# Patient Record
Sex: FEMALE | Race: WHITE | NOT HISPANIC OR LATINO
[De-identification: names, ages, dates, MRNs, and addresses within clinical notes are randomized per-mention and may not be internally consistent; named-entity substitution may affect disease eponyms.]

---

## 2017-05-08 ENCOUNTER — MEDICATION RENEWAL (OUTPATIENT)
Age: 47
End: 2017-05-08

## 2017-07-28 ENCOUNTER — APPOINTMENT (OUTPATIENT)
Dept: PAIN MANAGEMENT | Facility: CLINIC | Age: 47
End: 2017-07-28
Payer: COMMERCIAL

## 2017-07-28 VITALS
WEIGHT: 154 LBS | SYSTOLIC BLOOD PRESSURE: 108 MMHG | BODY MASS INDEX: 22.81 KG/M2 | HEART RATE: 66 BPM | DIASTOLIC BLOOD PRESSURE: 75 MMHG | HEIGHT: 69 IN

## 2017-07-28 PROCEDURE — 99213 OFFICE O/P EST LOW 20 MIN: CPT

## 2017-11-19 ENCOUNTER — TRANSCRIPTION ENCOUNTER (OUTPATIENT)
Age: 47
End: 2017-11-19

## 2018-01-01 ENCOUNTER — TRANSCRIPTION ENCOUNTER (OUTPATIENT)
Age: 48
End: 2018-01-01

## 2018-09-02 ENCOUNTER — TRANSCRIPTION ENCOUNTER (OUTPATIENT)
Age: 48
End: 2018-09-02

## 2018-09-04 ENCOUNTER — MEDICATION RENEWAL (OUTPATIENT)
Age: 48
End: 2018-09-04

## 2018-12-03 ENCOUNTER — MEDICATION RENEWAL (OUTPATIENT)
Age: 48
End: 2018-12-03

## 2019-03-14 ENCOUNTER — MEDICATION RENEWAL (OUTPATIENT)
Age: 49
End: 2019-03-14

## 2019-04-29 ENCOUNTER — TRANSCRIPTION ENCOUNTER (OUTPATIENT)
Age: 49
End: 2019-04-29

## 2019-05-21 ENCOUNTER — APPOINTMENT (OUTPATIENT)
Dept: INTERNAL MEDICINE | Facility: CLINIC | Age: 49
End: 2019-05-21
Payer: COMMERCIAL

## 2019-05-21 VITALS
DIASTOLIC BLOOD PRESSURE: 82 MMHG | RESPIRATION RATE: 18 BRPM | WEIGHT: 165 LBS | SYSTOLIC BLOOD PRESSURE: 118 MMHG | HEART RATE: 69 BPM | TEMPERATURE: 98.4 F | OXYGEN SATURATION: 98 % | BODY MASS INDEX: 24.44 KG/M2 | HEIGHT: 69 IN

## 2019-05-21 DIAGNOSIS — R55 SYNCOPE AND COLLAPSE: ICD-10-CM

## 2019-05-21 DIAGNOSIS — R76.0 RAISED ANTIBODY TITER: ICD-10-CM

## 2019-05-21 DIAGNOSIS — Z82.3 FAMILY HISTORY OF STROKE: ICD-10-CM

## 2019-05-21 DIAGNOSIS — F32.81 PREMENSTRUAL DYSPHORIC DISORDER: ICD-10-CM

## 2019-05-21 DIAGNOSIS — Z83.3 FAMILY HISTORY OF DIABETES MELLITUS: ICD-10-CM

## 2019-05-21 DIAGNOSIS — Z82.5 FAMILY HISTORY OF ASTHMA AND OTHER CHRONIC LOWER RESPIRATORY DISEASES: ICD-10-CM

## 2019-05-21 DIAGNOSIS — Z80.1 FAMILY HISTORY OF MALIGNANT NEOPLASM OF TRACHEA, BRONCHUS AND LUNG: ICD-10-CM

## 2019-05-21 DIAGNOSIS — R20.2 PARESTHESIA OF SKIN: ICD-10-CM

## 2019-05-21 DIAGNOSIS — Z87.19 PERSONAL HISTORY OF OTHER DISEASES OF THE DIGESTIVE SYSTEM: ICD-10-CM

## 2019-05-21 DIAGNOSIS — Z87.898 PERSONAL HISTORY OF OTHER SPECIFIED CONDITIONS: ICD-10-CM

## 2019-05-21 PROCEDURE — 90471 IMMUNIZATION ADMIN: CPT

## 2019-05-21 PROCEDURE — 81003 URINALYSIS AUTO W/O SCOPE: CPT | Mod: QW

## 2019-05-21 PROCEDURE — 99386 PREV VISIT NEW AGE 40-64: CPT | Mod: 25

## 2019-05-21 PROCEDURE — 90715 TDAP VACCINE 7 YRS/> IM: CPT

## 2019-05-21 NOTE — PHYSICAL EXAM

## 2019-05-21 NOTE — ASSESSMENT
[FreeTextEntry1] : New patient is a 48 year old female with a past medical history as above who presents for annual physical exam.\par

## 2019-05-21 NOTE — REVIEW OF SYSTEMS
[Recent Change In Weight] : ~T recent weight change [Lower Ext Edema] : lower extremity edema [Constipation] : constipation [Headache] : headache [Negative] : Respiratory [FreeTextEntry2] : gained 10 lbs in the past year  [FreeTextEntry5] : bilateral lower extremity edema  [de-identified] : migraine; intermittent tingling in fingers and toes bilaterally

## 2019-05-21 NOTE — PLAN
[FreeTextEntry1] : Cardiology\par start Ecotrin 81mg p.o.q.d. given family history of cardiolipin antibodies with family history of vascular edisease and clots\par follow up with cardiologist for baseline EKG \par Endocrinology\par hypothyroidism-continue Levothyroxine Sodium 112mcg p.o.q.d. - thyroid panel to be checked\par Vascular\par bilateral lower extremity edema - likely secondary to recent weight gain - recommended low sodium diet and increased CV exercise to promote weight loss \par Neurology\par migraine-continue Nortriptyline HCl 25mg p.o.q.h.s. - follow up with neurologist, Dr. Pavan Cosme \par tingling in upper and lower extremities bilaterally (fingers/toes) - possible secondary to being on Bactrim for recent UTI \par Infectious Disease\par Adacel vaccine administered \par \par Rx given for fasting blood work. \par check female panel, UA

## 2019-05-21 NOTE — ADDENDUM
[FreeTextEntry1] : I, Mahin Pemberton, acted solely as scribe for Dr. Tay Vásquez DO on this date 05/21/2019  3:30PM .\par \par All medical record entries made by the Scribe were at my, Dr. Tay Vásquez DO direction and personally dictated by me on 05/21/2019  3:30PM. I have reviewed the chart and agree that the record accurately reflects my personal performance of the history, physical exam, assessment and plan. I have also personally directed, reviewed and agreed with the chart.\par

## 2019-05-21 NOTE — HISTORY OF PRESENT ILLNESS
[FreeTextEntry1] : new patient annual physical exam  [de-identified] : New patient is a 48 year old female with a past medical history as below who presents for annual physical exam. Patient states she is taking all medications as prescribed and denies any adverse reactions or side effects. She denies heat/cold intolerance on Levothyroxine. She notes being started on Nortriptyline by her neurologist, Dr. Pavan Cosme for migraines. Patient notes bilateral lower extremity edema, likely secondary to recent weight gain of 10 pounds in the past year.  She notes recently visiting an urgent care facility for a UTI and being started on Bactrim. Symptoms of her UTI improved, but she began to notice tingling in her fingers and toes bilaterally 3 days into starting Bactrim. She states she returned to the urgent care facility and subsequent EKG was normal. Patient notes having a colonoscopy recently given history of GI issues and being diagnosed with IBS. Patient states she sees her gynecologist, Dr. Nilay Fisher annually for mammogram/breast sonogram. Last mammogram was 1 year ago. 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 She notes last febrile seizure was in 1999. She notes being hospitalized for her last episode of vasovagal syncope was 7-8 years ago. Testing revealed bradycardia and hypotension. Patient notes recent weight gain likely secondary to a poor diet/lack of CV exercise. Patient had a flu shot within the past year. Patient has not received a tetanus shot within the past 10 years. She inquires about the Adacel vaccine. Patient notes a recent trip to [place]. Patient is not fasting today. \par

## 2019-05-22 LAB
BILIRUB UR QL STRIP: NEGATIVE
CLARITY UR: CLEAR
COLLECTION METHOD: NORMAL
GLUCOSE UR-MCNC: NEGATIVE
HCG UR QL: 0.2 EU/DL
HGB UR QL STRIP.AUTO: NEGATIVE
KETONES UR-MCNC: NEGATIVE
LEUKOCYTE ESTERASE UR QL STRIP: NEGATIVE
NITRITE UR QL STRIP: NEGATIVE
PH UR STRIP: 7
PROT UR STRIP-MCNC: NEGATIVE
SP GR UR STRIP: 1.01

## 2019-06-18 ENCOUNTER — MESSAGE (OUTPATIENT)
Age: 49
End: 2019-06-18

## 2019-06-27 ENCOUNTER — APPOINTMENT (OUTPATIENT)
Dept: PAIN MANAGEMENT | Facility: CLINIC | Age: 49
End: 2019-06-27
Payer: COMMERCIAL

## 2019-06-27 VITALS
WEIGHT: 161 LBS | HEART RATE: 73 BPM | BODY MASS INDEX: 23.85 KG/M2 | DIASTOLIC BLOOD PRESSURE: 66 MMHG | HEIGHT: 69 IN | SYSTOLIC BLOOD PRESSURE: 100 MMHG

## 2019-06-27 PROCEDURE — 99213 OFFICE O/P EST LOW 20 MIN: CPT

## 2019-06-28 NOTE — HISTORY OF PRESENT ILLNESS
[FreeTextEntry1] : Pt rtc from 2 years and notes she is similar to previous.  Still with rare intermittent stereotyped events which are fairly well responsive to acute medication combination.  \par No other new medical issues.  No change in quality of headache\par Tolerates med well and pleased with care. [Nausea] : nausea [Headache] : headache [Photophobia] : photophobia [Phonophobia] : phonophobia [___ Times Per Month] : [unfilled] times per month [> 4 hours] : > 4 hours [Improved] : The patient reports ~his/her~ symptoms since the last visit have improved

## 2019-06-28 NOTE — ASSESSMENT
[FreeTextEntry1] : Would plan to continue with nortriptyline at low dose (pt notes daughter also very responsive ot low dose tricyclic.)\par RTC 1 year.

## 2019-06-28 NOTE — PHYSICAL EXAM
[General Appearance - Alert] : alert [General Appearance - In No Acute Distress] : in no acute distress [General Appearance - Well Nourished] : well nourished [General Appearance - Well Developed] : well developed [General Appearance - Well-Appearing] : healthy appearing [Oriented To Time, Place, And Person] : oriented to person, place, and time [Impaired Insight] : insight and judgment were intact [Affect] : the affect was normal [Mood] : the mood was normal [Memory Recent] : recent memory was not impaired [Memory Remote] : remote memory was not impaired [Person] : oriented to person [Place] : oriented to place [Time] : oriented to time [Short Term Intact] : short term memory intact [Remote Intact] : remote memory intact [Registration Intact] : recent registration memory intact [Concentration Intact] : normal concentrating ability [Visual Intact] : visual attention was ~T not ~L decreased [Fluency] : fluency intact [Comprehension] : comprehension intact [Current Events] : adequate knowledge of current events [Past History] : adequate knowledge of personal past history [Vocabulary] : adequate range of vocabulary [Cranial Nerves Optic (II)] : visual acuity intact bilaterally,  visual fields full to confrontation, pupils equal round and reactive to light [Cranial Nerves Oculomotor (III)] : extraocular motion intact [Cranial Nerves Trigeminal (V)] : facial sensation intact symmetrically [Cranial Nerves Facial (VII)] : face symmetrical [Cranial Nerves Vestibulocochlear (VIII)] : hearing was intact bilaterally [Cranial Nerves Glossopharyngeal (IX)] : tongue and palate midline [Cranial Nerves Hypoglossal (XII)] : there was no tongue deviation with protrusion [Cranial Nerves Accessory (XI - Cranial And Spinal)] : head turning and shoulder shrug symmetric [Motor Tone] : muscle tone was normal in all four extremities [Motor Strength] : muscle strength was normal in all four extremities [Involuntary Movements] : no involuntary movements were seen [No Muscle Atrophy] : normal bulk in all four extremities [Motor Handedness Right-Handed] : the patient is right hand dominant [Sensation Tactile Decrease] : light touch was intact [Balance] : balance was intact [Sclera] : the sclera and conjunctiva were normal [PERRL With Normal Accommodation] : pupils were equal in size, round, reactive to light, with normal accommodation [Extraocular Movements] : extraocular movements were intact [Outer Ear] : the ears and nose were normal in appearance [Neck Appearance] : the appearance of the neck was normal [Neck Cervical Mass (___cm)] : no neck mass was observed [Exaggerated Use Of Accessory Muscles For Inspiration] : no accessory muscle use [Edema] : there was no peripheral edema [Abdomen Tenderness] : non-tender [Abnormal Walk] : normal gait [Nail Clubbing] : no clubbing  or cyanosis of the fingernails [Musculoskeletal - Swelling] : no joint swelling seen [Skin Color & Pigmentation] : normal skin color and pigmentation [Skin Turgor] : normal skin turgor [] : no rash [Allodynia] : no ~T allodynia present [Limited Balance] : balance was intact [Past-pointing] : there was no past-pointing [Dysdiadochokinesia Bilaterally] : not present [Tremor] : no tremor present [Coordination - Dysmetria Impaired Finger-to-Nose Bilateral] : not present

## 2019-06-28 NOTE — REVIEW OF SYSTEMS
[Fever] : no fever [Chills] : no chills [Eyesight Problems] : no eyesight problems [Feeling Tired] : feeling tired [Feeling Poorly] : not feeling poorly [Chest Pain] : no chest pain [Loss Of Hearing] : no hearing loss [Cough] : no cough [Constipation] : no constipation [Palpitations] : no palpitations [Arthralgias] : arthralgias [Diarrhea] : no diarrhea [Skin Wound] : no skin wound [Skin Lesions] : no skin lesions [Muscle Weakness] : no muscle weakness [Itching] : no itching

## 2019-07-17 ENCOUNTER — APPOINTMENT (OUTPATIENT)
Dept: PAIN MANAGEMENT | Facility: CLINIC | Age: 49
End: 2019-07-17

## 2019-09-05 ENCOUNTER — TRANSCRIPTION ENCOUNTER (OUTPATIENT)
Age: 49
End: 2019-09-05

## 2019-09-05 DIAGNOSIS — Z86.39 PERSONAL HISTORY OF OTHER ENDOCRINE, NUTRITIONAL AND METABOLIC DISEASE: ICD-10-CM

## 2019-09-20 PROBLEM — Z86.39 HISTORY OF HASHIMOTO THYROIDITIS: Status: RESOLVED | Noted: 2019-05-21 | Resolved: 2019-09-20

## 2020-03-02 ENCOUNTER — RESULT CHARGE (OUTPATIENT)
Age: 50
End: 2020-03-02

## 2020-03-03 ENCOUNTER — FORM ENCOUNTER (OUTPATIENT)
Age: 50
End: 2020-03-03

## 2020-03-03 ENCOUNTER — APPOINTMENT (OUTPATIENT)
Dept: INTERNAL MEDICINE | Facility: CLINIC | Age: 50
End: 2020-03-03
Payer: COMMERCIAL

## 2020-03-03 ENCOUNTER — NON-APPOINTMENT (OUTPATIENT)
Age: 50
End: 2020-03-03

## 2020-03-03 VITALS
HEIGHT: 69 IN | SYSTOLIC BLOOD PRESSURE: 106 MMHG | DIASTOLIC BLOOD PRESSURE: 68 MMHG | WEIGHT: 173 LBS | HEART RATE: 68 BPM | TEMPERATURE: 98 F | BODY MASS INDEX: 25.62 KG/M2 | RESPIRATION RATE: 16 BRPM | OXYGEN SATURATION: 94 %

## 2020-03-03 PROCEDURE — 99214 OFFICE O/P EST MOD 30 MIN: CPT | Mod: 25

## 2020-03-03 PROCEDURE — 93000 ELECTROCARDIOGRAM COMPLETE: CPT | Mod: 59

## 2020-03-03 PROCEDURE — G0442 ANNUAL ALCOHOL SCREEN 15 MIN: CPT | Mod: 59

## 2020-03-04 ENCOUNTER — OUTPATIENT (OUTPATIENT)
Dept: OUTPATIENT SERVICES | Facility: HOSPITAL | Age: 50
LOS: 1 days | End: 2020-03-04
Payer: COMMERCIAL

## 2020-03-04 ENCOUNTER — RX CHANGE (OUTPATIENT)
Age: 50
End: 2020-03-04

## 2020-03-04 ENCOUNTER — APPOINTMENT (OUTPATIENT)
Dept: RADIOLOGY | Facility: CLINIC | Age: 50
End: 2020-03-04
Payer: COMMERCIAL

## 2020-03-04 DIAGNOSIS — R07.89 OTHER CHEST PAIN: ICD-10-CM

## 2020-03-04 LAB — UREA BREATH TEST QL: NEGATIVE

## 2020-03-04 PROCEDURE — 71046 X-RAY EXAM CHEST 2 VIEWS: CPT | Mod: 26

## 2020-03-04 PROCEDURE — 71046 X-RAY EXAM CHEST 2 VIEWS: CPT

## 2020-03-04 RX ORDER — NIZATIDINE 150 MG/1
150 CAPSULE ORAL
Qty: 90 | Refills: 0 | Status: DISCONTINUED | COMMUNITY
Start: 2020-03-03 | End: 2020-03-04

## 2020-03-04 NOTE — COUNSELING
[AUDIT-C Screening administered and reviewed] : AUDIT-C Screening administered and reviewed [Benefits of weight loss discussed] : Benefits of weight loss discussed [Encouraged to increase physical activity] : Encouraged to increase physical activity [Good understanding] : Patient has a good understanding of disease, goals and obesity follow-up plan

## 2020-03-06 NOTE — DATA REVIEWED
[No studies available for review at this time.] : No studies available for review at this time. [FreeTextEntry1] : EKG NSR bradycardia

## 2020-03-06 NOTE — REVIEW OF SYSTEMS
[Headache] : headache [Recent Change In Weight] : ~T recent weight change [Chest Pain] : chest pain [Heartburn] : heartburn [Negative] : Constitutional [Fever] : no fever [Chills] : no chills [Fatigue] : no fatigue [Hot Flashes] : no hot flashes [Discharge] : no discharge [Pain] : no pain [Redness] : no redness [Vision Problems] : no vision problems [Earache] : no earache [Nosebleed] : no nosebleeds [Sore Throat] : no sore throat [Postnasal Drip] : no postnasal drip [Palpitations] : no palpitations [Leg Claudication] : no leg claudication [Lower Ext Edema] : no lower extremity edema [Orthopnea] : no orthopnea [Paroxysmal Nocturnal Dyspnea] : no paroxysmal nocturnal dyspnea [Shortness Of Breath] : no shortness of breath [Wheezing] : no wheezing [Cough] : no cough [Dyspnea on Exertion] : no dyspnea on exertion [Abdominal Pain] : no abdominal pain [Nausea] : no nausea [Constipation] : no constipation [Diarrhea] : diarrhea [Vomiting] : no vomiting [Melena] : no melena [Dysuria] : no dysuria [Hematuria] : no hematuria [Joint Pain] : no joint pain [Joint Stiffness] : no joint stiffness [Muscle Pain] : no muscle pain [Easy Bruising] : no easy bruising [de-identified] : migraine; intermittent tingling in fingers and toes bilaterally

## 2020-03-06 NOTE — PLAN
[FreeTextEntry1] : Cardiology\par Check EKG - bradycardia noted.  No acute St- t wave changes.  Advised to follow up with cardio.  Request a consult report.  \par on  Ecotrin 81 mg p.o.q.d. given family history of cardiolipin antibodies with family history of vascular disease and clots\par request copy of labs\par Check CXR. \par \par Ortho costochondritis- Start Mobic. Advised no heavy lifting or exercise x 1 week \par \par Endocrinology\par hypothyroidism-continue Levothyroxine Sodium 112 mcg p.o.q.d. - thyroid panel to be checked pt given an Rx for fasting labs \par \par Neurology\par migraine- continue Nortriptyline HCl 25 mg p.o.q.h.s. - follow up with neurologist, Dr. Pavan Cosme \par \par GI - GERD - Check h.pylori -  Start Axid 150 mg daily.  Advised diet.  \par \par Rx for fasting labs\par \par Addendum reviewed \par PAP, Mammo and breast sonogram

## 2020-03-06 NOTE — HISTORY OF PRESENT ILLNESS
[Episodic] : episodic [___ Weeks ago] : [unfilled] weeks ago [Stable] : stable [Moderate] : moderate [FreeTextEntry4] : deep breathing  [FreeTextEntry8] : Patient states she has been having chest discomfort for a week or so.  Went to the cardiologist the other day Dr. Jacobsen office and saw his PA.  Had an EKG done and some blood test.  Was advised EKG and blood work was normal by Dr. Jacobsen but he wanted me to come back to the office for re- evaluation. States when she tries to take a deep breath in she feel that she cant secondary to having pain. States she always feel like she has to yawn and if she gets a complete yawn it relieves some symptoms.\par \par Had a recent mammogram wnl Performed at [facility] [place]  \par Also bee having bout of acid reflux.  States she has been taking her  Zantac to help with the symptoms.  \par Denies fever, chills or night sweats \par \par Recently been having more bout of migraine headaches.  Does see Dr. Cosme for her hx of migraine.  believes her cycle increase her bouts of headaches

## 2020-03-06 NOTE — HEALTH RISK ASSESSMENT
[Yes] : Yes [2 - 4 times a month (2 pts)] : 2-4 times a month (2 points) [1 or 2 (0 pts)] : 1 or 2 (0 points) [Never (0 pts)] : Never (0 points) [No] : In the past 12 months have you used drugs other than those required for medical reasons? No [No falls in past year] : Patient reported no falls in the past year [0] : 2) Feeling down, depressed, or hopeless: Not at all (0) [] : No [Audit-CScore] : 2 [Patient reported mammogram was normal] : Patient reported mammogram was normal [Patient reported PAP Smear was normal] : Patient reported PAP Smear was normal [MammogramDate] : 06/19 [MammogramComments] : BiRads 2  [PapSmearDate] : 08/19 [PapSmearComments] : NILM

## 2020-03-06 NOTE — ASSESSMENT
[FreeTextEntry1] : 49  year old female with a past medical history as above who presents for chest pressure and GERD \par

## 2020-03-06 NOTE — PHYSICAL EXAM
[No Acute Distress] : no acute distress [Well Developed] : well developed [Well Nourished] : well nourished [Well-Appearing] : well-appearing [PERRL] : pupils equal round and reactive to light [Normal Sclera/Conjunctiva] : normal sclera/conjunctiva [Normal Oropharynx] : the oropharynx was normal [EOMI] : extraocular movements intact [Normal Outer Ear/Nose] : the outer ears and nose were normal in appearance [No JVD] : no jugular venous distention [Supple] : supple [Thyroid Normal, No Nodules] : the thyroid was normal and there were no nodules present [No Lymphadenopathy] : no lymphadenopathy [No Accessory Muscle Use] : no accessory muscle use [Clear to Auscultation] : lungs were clear to auscultation bilaterally [No Respiratory Distress] : no respiratory distress  [Regular Rhythm] : with a regular rhythm [Normal Rate] : normal rate  [Normal S1, S2] : normal S1 and S2 [No Abdominal Bruit] : a ~M bruit was not heard ~T in the abdomen [No Carotid Bruits] : no carotid bruits [Pedal Pulses Present] : the pedal pulses are present [No Palpable Aorta] : no palpable aorta [No Extremity Clubbing/Cyanosis] : no extremity clubbing/cyanosis [Soft] : abdomen soft [Non-distended] : non-distended [Non Tender] : non-tender [No HSM] : no HSM [No Masses] : no abdominal mass palpated [Normal Anterior Cervical Nodes] : no anterior cervical lymphadenopathy [Normal Bowel Sounds] : normal bowel sounds [Normal Posterior Cervical Nodes] : no posterior cervical lymphadenopathy [No CVA Tenderness] : no CVA  tenderness [No Spinal Tenderness] : no spinal tenderness [Grossly Normal Strength/Tone] : grossly normal strength/tone [No Joint Swelling] : no joint swelling [Normal Gait] : normal gait [No Rash] : no rash [Coordination Grossly Intact] : coordination grossly intact [No Focal Deficits] : no focal deficits [Deep Tendon Reflexes (DTR)] : deep tendon reflexes were 2+ and symmetric [Normal Affect] : the affect was normal [Normal Insight/Judgement] : insight and judgment were intact [Normal TMs] : both tympanic membranes were normal [Normal Nasal Mucosa] : the nasal mucosa was normal [No Edema] : there was no peripheral edema [Speech Grossly Normal] : speech grossly normal [Alert and Oriented x3] : oriented to person, place, and time [Normal Mood] : the mood was normal [de-identified] : with murmur  [de-identified] : right sided chest wall tenderness noted around rib 3-4 near sternum

## 2020-03-11 ENCOUNTER — TRANSCRIPTION ENCOUNTER (OUTPATIENT)
Age: 50
End: 2020-03-11

## 2020-03-11 ENCOUNTER — APPOINTMENT (OUTPATIENT)
Dept: INTERNAL MEDICINE | Facility: CLINIC | Age: 50
End: 2020-03-11

## 2020-03-13 LAB
25(OH)D3 SERPL-MCNC: 30.2 NG/ML
ALBUMIN SERPL ELPH-MCNC: 4.4 G/DL
ALP BLD-CCNC: 52 U/L
ALT SERPL-CCNC: 11 U/L
ANION GAP SERPL CALC-SCNC: 13 MMOL/L
APPEARANCE: CLEAR
AST SERPL-CCNC: 17 U/L
BASOPHILS # BLD AUTO: 0.02 K/UL
BASOPHILS NFR BLD AUTO: 0.4 %
BILIRUB SERPL-MCNC: 0.4 MG/DL
BILIRUBIN URINE: NEGATIVE
BLOOD URINE: NEGATIVE
BUN SERPL-MCNC: 16 MG/DL
CALCIUM SERPL-MCNC: 9.4 MG/DL
CHLORIDE SERPL-SCNC: 105 MMOL/L
CHOLEST SERPL-MCNC: 172 MG/DL
CHOLEST/HDLC SERPL: 2.8 RATIO
CO2 SERPL-SCNC: 25 MMOL/L
COLOR: COLORLESS
CREAT SERPL-MCNC: 0.87 MG/DL
EOSINOPHIL # BLD AUTO: 0.06 K/UL
EOSINOPHIL NFR BLD AUTO: 1.3 %
ERYTHROCYTE [SEDIMENTATION RATE] IN BLOOD BY WESTERGREN METHOD: 18 MM/HR
GLUCOSE QUALITATIVE U: NEGATIVE
GLUCOSE SERPL-MCNC: 86 MG/DL
HCT VFR BLD CALC: 43.7 %
HDLC SERPL-MCNC: 61 MG/DL
HGB BLD-MCNC: 13.9 G/DL
IMM GRANULOCYTES NFR BLD AUTO: 0 %
KETONES URINE: NEGATIVE
LDLC SERPL CALC-MCNC: 98 MG/DL
LEUKOCYTE ESTERASE URINE: NEGATIVE
LYMPHOCYTES # BLD AUTO: 1.66 K/UL
LYMPHOCYTES NFR BLD AUTO: 35.1 %
MAN DIFF?: NORMAL
MCHC RBC-ENTMCNC: 31.8 GM/DL
MCHC RBC-ENTMCNC: 31.9 PG
MCV RBC AUTO: 100.2 FL
MONOCYTES # BLD AUTO: 0.36 K/UL
MONOCYTES NFR BLD AUTO: 7.6 %
NEUTROPHILS # BLD AUTO: 2.63 K/UL
NEUTROPHILS NFR BLD AUTO: 55.6 %
NITRITE URINE: NEGATIVE
PH URINE: 7
PLATELET # BLD AUTO: 204 K/UL
POTASSIUM SERPL-SCNC: 4.2 MMOL/L
PROT SERPL-MCNC: 6.7 G/DL
PROTEIN URINE: NEGATIVE
RBC # BLD: 4.36 M/UL
RBC # FLD: 12.8 %
SODIUM SERPL-SCNC: 143 MMOL/L
SPECIFIC GRAVITY URINE: 1.01
T4 FREE SERPL-MCNC: 1.2 NG/DL
TRIGL SERPL-MCNC: 67 MG/DL
TSH SERPL-ACNC: 3.2 UIU/ML
UROBILINOGEN URINE: NORMAL
WBC # FLD AUTO: 4.73 K/UL

## 2020-03-13 RX ORDER — BENZONATATE 200 MG/1
200 CAPSULE ORAL 3 TIMES DAILY
Qty: 30 | Refills: 0 | Status: COMPLETED | COMMUNITY
Start: 2020-03-13 | End: 2020-03-23

## 2020-05-26 ENCOUNTER — RX CHANGE (OUTPATIENT)
Age: 50
End: 2020-05-26

## 2020-05-26 ENCOUNTER — RX RENEWAL (OUTPATIENT)
Age: 50
End: 2020-05-26

## 2020-07-10 ENCOUNTER — TRANSCRIPTION ENCOUNTER (OUTPATIENT)
Age: 50
End: 2020-07-10

## 2020-07-10 ENCOUNTER — APPOINTMENT (OUTPATIENT)
Dept: PAIN MANAGEMENT | Facility: CLINIC | Age: 50
End: 2020-07-10
Payer: COMMERCIAL

## 2020-07-10 PROCEDURE — 99213 OFFICE O/P EST LOW 20 MIN: CPT | Mod: 95

## 2020-07-10 NOTE — HISTORY OF PRESENT ILLNESS
[FreeTextEntry1] : Pt notes that she continues to have her regular headaches but relatively rarely.  Did have one 4 day episode in March which she found disabling "like before the medication." and associated cough and "sickness" for 1 month and she suspects might have been COVID though she tested antibody negative.  Gets sporadic minor events and generally treated with nsaids.\miah Had SOB with event above and had followed up with her cardiologist who she started to see prophylactically because of her mother's history of cardiac disease.  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 [Headache] : headache [Nausea] : nausea [Photophobia] : photophobia [Scalp Tenderness] : scalp tenderness [Phonophobia] : phonophobia [Neck Pain] : neck pain [> 4 hours] : > 4 hours [___ Times Per Month] : [unfilled] times per month [Stable] : The patient reports ~his/her~ symptoms since the last visit are stable [improved] : improved [de-identified] : 1 episode of status.

## 2020-07-10 NOTE — ASSESSMENT
[FreeTextEntry1] : Will continue nortriptyline\par to continue with nervio\par continue with diet and exercise.

## 2020-07-10 NOTE — REVIEW OF SYSTEMS
[Fever] : no fever [Feeling Poorly] : feeling poorly [Chills] : no chills [Nasal Discharge] : no nasal discharge [Feeling Tired] : feeling tired [Eye Pain] : eye pain [Chest Pain] : no chest pain [Palpitations] : no palpitations [Shortness Of Breath] : no shortness of breath [Cough] : no cough [Constipation] : no constipation [Arthralgias] : arthralgias [Neck Pain] : neck pain [Diarrhea] : no diarrhea [Skin Wound] : no skin wound [Skin Lesions] : no skin lesions [Lower Back Pain] : no lower back pain [Itching] : no itching [Sleep Disturbances] : sleep disturbances [Dizziness] : dizziness [Swollen Glands] : no swollen glands [Muscle Weakness] : no muscle weakness [Swollen Glands In The Neck] : no swollen glands in the neck

## 2020-07-10 NOTE — PHYSICAL EXAM
[General Appearance - Alert] : alert [General Appearance - Well Nourished] : well nourished [General Appearance - In No Acute Distress] : in no acute distress [General Appearance - Well Developed] : well developed [Affect] : the affect was normal [Impaired Insight] : insight and judgment were intact [Mood] : the mood was normal [Person] : oriented to person [Place] : oriented to place [Time] : oriented to time [Remote Intact] : remote memory intact [Registration Intact] : recent registration memory intact [Naming Objects] : no difficulty naming common objects [Visual Intact] : visual attention was ~T not ~L decreased [Span Intact] : the attention span was normal [Fluency] : fluency intact [Current Events] : adequate knowledge of current events [Comprehension] : comprehension intact [Past History] : adequate knowledge of personal past history [Cranial Nerves Optic (II)] : visual acuity intact bilaterally,  visual fields full to confrontation, pupils equal round and reactive to light [Vocabulary] : adequate range of vocabulary [Cranial Nerves Facial (VII)] : face symmetrical [Cranial Nerves Hypoglossal (XII)] : there was no tongue deviation with protrusion [Cranial Nerves Vestibulocochlear (VIII)] : hearing was intact bilaterally [Cranial Nerves Oculomotor (III)] : extraocular motion intact [No Muscle Atrophy] : normal bulk in all four extremities [Motor Handedness Right-Handed] : the patient is right hand dominant [Tremor] : no tremor present [Abnormal Walk] : normal gait [Dysdiadochokinesia Bilaterally] : not present [Motor Strength Upper Extremities Bilaterally] : strength was normal in both upper extremities [Outer Ear] : the ears and nose were normal in appearance [Neck Cervical Mass (___cm)] : no neck mass was observed [Hearing Threshold Finger Rub Not Travis] : hearing was normal [] : no rash [Involuntary Movements] : no involuntary movements were seen [Skin Color & Pigmentation] : normal skin color and pigmentation

## 2020-11-02 ENCOUNTER — TRANSCRIPTION ENCOUNTER (OUTPATIENT)
Age: 50
End: 2020-11-02

## 2020-11-13 ENCOUNTER — TRANSCRIPTION ENCOUNTER (OUTPATIENT)
Age: 50
End: 2020-11-13

## 2020-11-16 ENCOUNTER — TRANSCRIPTION ENCOUNTER (OUTPATIENT)
Age: 50
End: 2020-11-16

## 2020-11-24 ENCOUNTER — NON-APPOINTMENT (OUTPATIENT)
Age: 50
End: 2020-11-24

## 2021-02-04 ENCOUNTER — TRANSCRIPTION ENCOUNTER (OUTPATIENT)
Age: 51
End: 2021-02-04

## 2021-02-16 DIAGNOSIS — Z20.822 CONTACT WITH AND (SUSPECTED) EXPOSURE TO COVID-19: ICD-10-CM

## 2021-02-22 ENCOUNTER — TRANSCRIPTION ENCOUNTER (OUTPATIENT)
Age: 51
End: 2021-02-22

## 2021-03-05 ENCOUNTER — NON-APPOINTMENT (OUTPATIENT)
Age: 51
End: 2021-03-05

## 2021-03-05 ENCOUNTER — APPOINTMENT (OUTPATIENT)
Dept: INTERNAL MEDICINE | Facility: CLINIC | Age: 51
End: 2021-03-05
Payer: COMMERCIAL

## 2021-03-05 VITALS
DIASTOLIC BLOOD PRESSURE: 78 MMHG | HEIGHT: 69 IN | SYSTOLIC BLOOD PRESSURE: 124 MMHG | HEART RATE: 72 BPM | OXYGEN SATURATION: 97 % | TEMPERATURE: 97.7 F | WEIGHT: 167.38 LBS | RESPIRATION RATE: 16 BRPM | BODY MASS INDEX: 24.79 KG/M2

## 2021-03-05 DIAGNOSIS — Z71.89 OTHER SPECIFIED COUNSELING: ICD-10-CM

## 2021-03-05 DIAGNOSIS — D68.52 PROTHROMBIN GENE MUTATION: ICD-10-CM

## 2021-03-05 DIAGNOSIS — N95.1 MENOPAUSAL AND FEMALE CLIMACTERIC STATES: ICD-10-CM

## 2021-03-05 DIAGNOSIS — Z00.00 ENCOUNTER FOR GENERAL ADULT MEDICAL EXAMINATION W/OUT ABNORMAL FINDINGS: ICD-10-CM

## 2021-03-05 PROCEDURE — 93000 ELECTROCARDIOGRAM COMPLETE: CPT | Mod: 59

## 2021-03-05 PROCEDURE — G0442 ANNUAL ALCOHOL SCREEN 15 MIN: CPT | Mod: NC

## 2021-03-05 PROCEDURE — 36415 COLL VENOUS BLD VENIPUNCTURE: CPT

## 2021-03-05 PROCEDURE — 99072 ADDL SUPL MATRL&STAF TM PHE: CPT

## 2021-03-05 PROCEDURE — 99396 PREV VISIT EST AGE 40-64: CPT | Mod: 25

## 2021-03-05 RX ORDER — MELOXICAM 7.5 MG/1
7.5 TABLET ORAL
Qty: 21 | Refills: 1 | Status: DISCONTINUED | COMMUNITY
Start: 2020-03-03 | End: 2021-03-05

## 2021-03-07 PROBLEM — N95.1 VASOMOTOR SYMPTOMS DUE TO MENOPAUSE: Status: ACTIVE | Noted: 2021-03-07

## 2021-03-07 PROBLEM — Z71.89 VACCINE COUNSELING: Status: ACTIVE | Noted: 2021-03-07

## 2021-03-07 RX ORDER — ASPIRIN ENTERIC COATED TABLETS 81 MG 81 MG/1
81 TABLET, DELAYED RELEASE ORAL
Qty: 30 | Refills: 5 | Status: ACTIVE | COMMUNITY
Start: 2021-03-05

## 2021-03-07 NOTE — ASSESSMENT
[FreeTextEntry1] : Patient is a 50 year old female with a past medical history as above who presents for an annual wellness visit.

## 2021-03-07 NOTE — PLAN
[FreeTextEntry1] : Cardiovascular\par continue Aspirin 81mg p.o.q.d. given FHx of vascular disease/clotting disorders \par Endocrinology\par hypothyroidism - continue Levothyroxine Sodium 112mcg p.o.q.o.d. as directed - check thyroid panel \par Gynecology\par hot flashes - likely secondary to menopause - discussed starting Black Cohosh; recommended following up with GYN, Mesbah to further discuss treatment options\par Neurology\par migraine - continue Nortriptyline HCl 25mg p.o.q.h.s. as directed - continue to follow up with neurologist, Dr. Cosme \par Gastroenterology\par GERD - continue antireflux measures\par Urology\par overactive bladder - continue Tolterodine Tartrate ER 4mg p.o.q.h.s. as directed - continue to follow up with urologist, Dr. Jovani Gunn  \par history of frequent UTIs - continue Methenamine Hippurate 1 GM p.o. as directed - continue to follow up with urologist, Dr. Jovani Gunn  \par \par check EKG (results as above), female panel, hemoglobin A1C, thyroid panel, and UA

## 2021-03-07 NOTE — HEALTH RISK ASSESSMENT
[Yes] : Yes [2 - 4 times a month (2 pts)] : 2-4 times a month (2 points) [1 or 2 (0 pts)] : 1 or 2 (0 points) [Never (0 pts)] : Never (0 points) [No] : In the past 12 months have you used drugs other than those required for medical reasons? No [0] : 2) Feeling down, depressed, or hopeless: Not at all (0) [] : No [Audit-CScore] : 2 [[id number]] : 0 [MammogramDate] : 06/20 [MammogramComments] : No mammographic or sonographic evidence of malignancy; BI-RADS 2: Benign findings.

## 2021-03-07 NOTE — ADDENDUM
[FreeTextEntry1] : I, Mahin Pemberton, acted solely as scribe for Dr. Tay Vásquez DO on this date 03/05/2021  3:20PM .\par \par All medical record entries made by the Scribe were at my, Dr. Tay Vásquez DO direction and personally dictated by me on 03/05/2021  3:20PM. I have reviewed the chart and agree that the record accurately reflects my personal performance of the history, physical exam, assessment and plan. I have also personally directed, reviewed and agreed with the chart.\par

## 2021-03-07 NOTE — REVIEW OF SYSTEMS
[Hot Flashes] : hot flashes [Insomnia] : insomnia [Negative] : Heme/Lymph [FreeTextEntry8] : overactive bladder

## 2021-03-07 NOTE — HISTORY OF PRESENT ILLNESS
[FreeTextEntry1] : annual wellness visit [de-identified] : Patient is a 50 year old female with a past medical history as below who presents for an annual wellness visit. Patient states she is taking all medications as prescribed and denies any adverse reactions or side effects. She denies heat/cold intolerance on Levothyroxine Sodium. Patient has seen GYN, Dr. Fisher in the past year for her annual visit. Her last breast imaging was in June 2020. Patient notes hot flashes likely secondary to menopause. Patient notes a history of frequent UTIs, most recently in November 2020. She completed 4 courses of oral antibiotics. She was then started on a course of IV antibiotics as bacterial cultures per urologist, Dr. Jovani Gunn demonstrated antibiotic resistance. Culture was negative after completing the course of IV antibiotics. Patient also notes being started on Tolterodine Tartrate for an overactive bladder. Patient received the Tdap (Adacel) Vaccine on 5/21/19. Patient states she has received both doses of the COVID-19 Vaccine. She notes recently testing negative for COVID-19 antibodies.

## 2021-08-02 ENCOUNTER — TRANSCRIPTION ENCOUNTER (OUTPATIENT)
Age: 51
End: 2021-08-02

## 2021-11-11 ENCOUNTER — APPOINTMENT (OUTPATIENT)
Dept: PAIN MANAGEMENT | Facility: CLINIC | Age: 51
End: 2021-11-11
Payer: COMMERCIAL

## 2021-11-11 VITALS
BODY MASS INDEX: 25.48 KG/M2 | WEIGHT: 172 LBS | HEART RATE: 61 BPM | SYSTOLIC BLOOD PRESSURE: 105 MMHG | DIASTOLIC BLOOD PRESSURE: 78 MMHG | HEIGHT: 69 IN

## 2021-11-11 PROCEDURE — 99212 OFFICE O/P EST SF 10 MIN: CPT

## 2021-11-11 RX ORDER — TOLTERODINE TARTRATE 4 MG/1
4 CAPSULE, EXTENDED RELEASE ORAL
Refills: 0 | Status: DISCONTINUED | COMMUNITY
Start: 2021-03-05 | End: 2021-11-11

## 2021-11-11 NOTE — REVIEW OF SYSTEMS
[Fever] : no fever [Chills] : no chills [Chest Pain] : no chest pain [Palpitations] : no palpitations [Shortness Of Breath] : no shortness of breath [Dizziness] : no dizziness [Fainting] : no fainting [Anxiety] : no anxiety [Depression] : no depression

## 2021-11-11 NOTE — ASSESSMENT
[FreeTextEntry1] : No changes today . \par Consider decrease of dose of Nortriptyline. \par \par Dr Cosme on site , billed incident to service.

## 2021-11-11 NOTE — PHYSICAL EXAM
[General Appearance - Alert] : alert [General Appearance - Well-Appearing] : healthy appearing [Oriented To Time, Place, And Person] : oriented to person, place, and time [Affect] : the affect was normal [Mood] : the mood was normal [Cranial Nerves Facial (VII)] : face symmetrical [Cranial Nerves Accessory (XI - Cranial And Spinal)] : head turning and shoulder shrug symmetric [Cranial Nerves Hypoglossal (XII)] : there was no tongue deviation with protrusion [Motor Strength] : muscle strength was normal in all four extremities [Paresis Pronator Drift Right-Sided] : no pronator drift on the right [Paresis Pronator Drift Left-Sided] : no pronator drift on the left [Motor Strength Upper Extremities Bilaterally] : strength was normal in both upper extremities [Motor Strength Lower Extremities Bilaterally] : strength was normal in both lower extremities [Coordination - Dysmetria Impaired Finger-to-Nose Bilateral] : not present [Sclera] : the sclera and conjunctiva were normal [PERRL With Normal Accommodation] : pupils were equal in size, round, reactive to light, with normal accommodation [] : no respiratory distress [Extraocular Movements] : extraocular movements were intact [Edema] : there was no peripheral edema

## 2021-11-11 NOTE — HISTORY OF PRESENT ILLNESS
[FreeTextEntry1] : Pt returns today for a follow up visit. \par Has been taking Nortriptyline 25mg qhs . Reports it is helpful to prevent  migraine . \par Does not recall the last migraine event . \par Denies any adverse effects . \par Health has been good .\par Denies recent illness or hospitalization .  [Headache] : headache

## 2021-12-18 ENCOUNTER — TRANSCRIPTION ENCOUNTER (OUTPATIENT)
Age: 51
End: 2021-12-18

## 2022-01-26 ENCOUNTER — NON-APPOINTMENT (OUTPATIENT)
Age: 52
End: 2022-01-26

## 2022-07-19 PROBLEM — D68.52 HETEROZYGOUS FOR PROTHROMBIN G20210A MUTATION: Status: RESOLVED | Noted: 2021-03-05 | Resolved: 2022-07-19

## 2022-08-31 ENCOUNTER — APPOINTMENT (OUTPATIENT)
Dept: ORTHOPEDIC SURGERY | Facility: CLINIC | Age: 52
End: 2022-08-31

## 2022-08-31 VITALS — WEIGHT: 170 LBS | HEIGHT: 69 IN | BODY MASS INDEX: 25.18 KG/M2

## 2022-08-31 PROCEDURE — 72110 X-RAY EXAM L-2 SPINE 4/>VWS: CPT

## 2022-08-31 PROCEDURE — 99204 OFFICE O/P NEW MOD 45 MIN: CPT

## 2022-08-31 PROCEDURE — 72170 X-RAY EXAM OF PELVIS: CPT

## 2022-08-31 NOTE — HISTORY OF PRESENT ILLNESS
[Lower back] : lower back [Gradual] : gradual [9] : 9 [0] : 0 [Localized] : localized [Stabbing] : stabbing [Intermittent] : intermittent [Household chores] : household chores [Physical therapy] : physical therapy [Sitting] : sitting [Standing] : standing [de-identified] : 8/31/22:  53 yo F  pt presents here today  with  lower spine pain for years  - has "flare ups" - was having spasms on the left side in the spring after playing tennis - had 3x major flare ups over the summer \par \par Pain in the lower back left side - stabbing pain in the left lower back - got really bad few nights ago - was bad about a week before that \par \par Not really going down the legs \par \par tried ice pack \par \par pt has try physical therapy ,chiro care,acupucture,massage therapy in the past with some relief \par No recent injectoins\par No surgery \par pt saw Dr Henriquez back in  2018 got mri done an tpi injection which helped \par \par xrays today:\par L spine - slight curvature\par AP PELVIS - negatie \par \par IBS/hashimotos\par No hx of cancer  [] : no [FreeTextEntry5] : no injury  [de-identified] : Dr Jose De Jesus Cabello  [de-identified] : x rays and mri done at [facility]  back in 2018 [de-identified] : nothing

## 2022-08-31 NOTE — DISCUSSION/SUMMARY
[de-identified] : severe back pain flare up \par PT\par mobic/flexeril \par indicated for MRi L spine - eval disc pathology \par fu to review the MRi

## 2022-09-02 ENCOUNTER — FORM ENCOUNTER (OUTPATIENT)
Age: 52
End: 2022-09-02

## 2022-09-03 ENCOUNTER — APPOINTMENT (OUTPATIENT)
Dept: MRI IMAGING | Facility: CLINIC | Age: 52
End: 2022-09-03

## 2022-09-03 PROCEDURE — 72148 MRI LUMBAR SPINE W/O DYE: CPT

## 2022-09-08 ENCOUNTER — TRANSCRIPTION ENCOUNTER (OUTPATIENT)
Age: 52
End: 2022-09-08

## 2022-09-11 ENCOUNTER — RESULT CHARGE (OUTPATIENT)
Age: 52
End: 2022-09-11

## 2022-09-12 ENCOUNTER — NON-APPOINTMENT (OUTPATIENT)
Age: 52
End: 2022-09-12

## 2022-09-12 ENCOUNTER — APPOINTMENT (OUTPATIENT)
Dept: INTERNAL MEDICINE | Facility: CLINIC | Age: 52
End: 2022-09-12

## 2022-09-12 VITALS
HEIGHT: 69 IN | HEART RATE: 68 BPM | TEMPERATURE: 97.2 F | OXYGEN SATURATION: 98 % | WEIGHT: 184.44 LBS | SYSTOLIC BLOOD PRESSURE: 122 MMHG | BODY MASS INDEX: 27.32 KG/M2 | RESPIRATION RATE: 16 BRPM | DIASTOLIC BLOOD PRESSURE: 70 MMHG

## 2022-09-12 DIAGNOSIS — R10.2 PELVIC AND PERINEAL PAIN: ICD-10-CM

## 2022-09-12 DIAGNOSIS — Z78.0 ASYMPTOMATIC MENOPAUSAL STATE: ICD-10-CM

## 2022-09-12 DIAGNOSIS — Z13.21 ENCOUNTER FOR SCREENING FOR NUTRITIONAL DISORDER: ICD-10-CM

## 2022-09-12 DIAGNOSIS — Z13.820 ENCOUNTER FOR SCREENING FOR OSTEOPOROSIS: ICD-10-CM

## 2022-09-12 DIAGNOSIS — R60.0 LOCALIZED EDEMA: ICD-10-CM

## 2022-09-12 PROCEDURE — 93000 ELECTROCARDIOGRAM COMPLETE: CPT | Mod: 59

## 2022-09-12 PROCEDURE — 81003 URINALYSIS AUTO W/O SCOPE: CPT | Mod: QW

## 2022-09-12 PROCEDURE — 99214 OFFICE O/P EST MOD 30 MIN: CPT | Mod: 25

## 2022-09-12 PROCEDURE — 36415 COLL VENOUS BLD VENIPUNCTURE: CPT

## 2022-09-12 RX ORDER — D-MANNOSE
POWDER (GRAM) ORAL DAILY
Refills: 0 | Status: DISCONTINUED | COMMUNITY
Start: 2021-03-05 | End: 2022-09-12

## 2022-09-12 RX ORDER — METHENAMINE HIPPURATE 1 G/1
1 TABLET ORAL
Refills: 0 | Status: DISCONTINUED | COMMUNITY
Start: 2021-03-05 | End: 2022-09-12

## 2022-09-12 RX ORDER — MULTIVITAMIN
TABLET ORAL
Refills: 0 | Status: DISCONTINUED | COMMUNITY
Start: 2021-03-05 | End: 2022-09-12

## 2022-09-12 RX ORDER — THIAMINE HCL 100 MG
500 TABLET ORAL
Refills: 0 | Status: DISCONTINUED | COMMUNITY
Start: 2021-03-05 | End: 2022-09-12

## 2022-09-12 NOTE — PAST MEDICAL HISTORY
[Postmenopausal] : postmenopausal [Menopause Age____] : age at menopause was [unfilled] [Definite ___ (Date)] : the last menstrual period was [unfilled]

## 2022-09-13 PROBLEM — Z13.21 ENCOUNTER FOR VITAMIN DEFICIENCY SCREENING: Status: ACTIVE | Noted: 2020-03-03

## 2022-09-13 PROBLEM — R60.0 LOWER EXTREMITY EDEMA: Status: ACTIVE | Noted: 2019-05-21

## 2022-09-13 LAB
BILIRUB UR QL STRIP: NORMAL
CLARITY UR: CLEAR
COLLECTION METHOD: NORMAL
GLUCOSE UR-MCNC: NORMAL
HCG UR QL: 0.2 EU/DL
HGB UR QL STRIP.AUTO: NORMAL
KETONES UR-MCNC: NORMAL
LEUKOCYTE ESTERASE UR QL STRIP: NORMAL
NITRITE UR QL STRIP: NORMAL
PH UR STRIP: 6
PROT UR STRIP-MCNC: NORMAL
SP GR UR STRIP: 1.01

## 2022-09-13 NOTE — HEALTH RISK ASSESSMENT
[Yes] : Yes [2 - 4 times a month (2 pts)] : 2-4 times a month (2 points) [1 or 2 (0 pts)] : 1 or 2 (0 points) [Never (0 pts)] : Never (0 points) [No] : In the past 12 months have you used drugs other than those required for medical reasons? No [0] : 2) Feeling down, depressed, or hopeless: Not at all (0) [No falls in past year] : Patient reported no falls in the past year [Audit-CScore] : 2 [[id number]] : 0

## 2022-09-13 NOTE — PLAN
[FreeTextEntry1] : Cardiovascular  -  atypical chest pressure- bradycardia- reviewed EKG with the patient.  Advised patient to follow-up with cardiology for noninvasive testing.    Patient states she had an appoint with cardiologist but had to cancel it,  states she was scheduled for an echocardiogram.\par continue Aspirin 81mg p.o.q.d. given FHx of vascular disease/clotting disorders \par   History of peripheral edema.  Advised patient on physical exam no edema was noted.  Check serum proBNP\par \par Endocrinology\par hypothyroidism - continue Levothyroxine Sodium 112mcg p.o.q.o.d. as directed -   TSH, free T4\par \par Gynecology\par hot flashes - likely secondary to menopause  advised to follow-up with gynecology for routine Pap smears.  Clinical breast exam.\par  check bone density scan.  Advised weightbearing exercise and calcium intake.\par \par Neurology\par migraine - continue Nortriptyline HCl 25mg p.o.q.h.s. as directed - continue to follow up with neurologist, Dr. Cosme \par \par Gastroenterology    Right lower quadrant abdominal pain.  Advised patient to go for abdominal sonogram and pelvic sonogram.  Check CBC comprehensive metabolic.\par GERD - continue antireflux measures\par \par  urology  history of frequent UTIs - continue Methenamine Hippurate 1 GM p.o. as directed - continue to follow up with urologist, Dr. Jovani Gunn  \par \par Patient  education  - COVID-19   Counseling and education provided to the patient.  Advised sign and symptoms of the virus.  Advised contact precautions.  Educated patient on proper hand washing and to participate in social distancing.  \par \par Patient is in full awareness of the plan and agrees to it.  All pt question was answered.  \par \par I spent 32  Minutes with the patient, half of which we discussed finding on physical exam  and coordinated care.  As well as reviewed my plans and follow ups. \par  \par   All patient's questions and concerns were addressed

## 2022-09-13 NOTE — ASSESSMENT
[FreeTextEntry1] :   A 52-year-old female, who presents to the office with multiple medical concerns and complaints.  Patient with atypical chest pressure, history of lower extremity swelling, lower extremity tenderness, right lower quadrant pain,   Post menopause.

## 2022-09-13 NOTE — REVIEW OF SYSTEMS
[Hot Flashes] : hot flashes [Negative] : Heme/Lymph [Palpitations] : palpitations [Lower Ext Edema] : lower extremity edema [Constipation] : constipation [Diarrhea] : diarrhea [Itching] : no itching [Skin Rash] : no skin rash [Headache] : no headache [Dizziness] : no dizziness [Fainting] : no fainting [Memory Loss] : no memory loss [Suicidal] : not suicidal [Insomnia] : no insomnia [Anxiety] : no anxiety [Depression] : no depression [Easy Bruising] : no easy bruising [Swollen Glands] : no swollen glands [FreeTextEntry2] :   Postmenopausal [FreeTextEntry5] :  chest pressure at times [FreeTextEntry7] :  right lower quadrant abdominal discomfort [FreeTextEntry8] : overactive bladder

## 2022-09-13 NOTE — HISTORY OF PRESENT ILLNESS
[FreeTextEntry1] :  fasting labs and discussion on recent weight gain and leg pain. [de-identified] :   A 52-year-old female, with a past medical history as noted below, presents to the office for fasting blood work and concerns about leg swelling.  Patient states the legs are not  swollen today.  Patient states recently went to [place] and her legs were significantly swollen.\par   Patient also states she has chronic discomfort in her lower extremity for years.   states when she goes to get a pedicure and they try to start to massage her calf muscles if they rub too hard it hurts.\par \par   States she gained about  14 pounds in the last few months.  Patient denies feeling shortness of breath\par \par   Patient states today she feels that her heart is racing and also has noticed some slight chest pressure.  Patient denies any  associated symptoms of nausea, vomiting, shortness of breath.    Patient is unaware what makes it better or worse.\par \par   Lastly about 3 weeks ago   Had sharp suprapubic pain that radiated to the right hip.  Approximately started on August 23.  Denies any change in her bowel habits.  Does have a history of IBS   With either constipation/diarrhea but mostly constipation.   states over the last few days the abdominal pain/crampiness has subsided.  Denies any blood in the stools.  Was unaware of what made it better or worse.\par \par  patient is postmenopausal.  Did not denies ever having a bone density scan.

## 2022-09-13 NOTE — PHYSICAL EXAM
[No Acute Distress] : no acute distress [Well Nourished] : well nourished [Well Developed] : well developed [Well-Appearing] : well-appearing [Normal Sclera/Conjunctiva] : normal sclera/conjunctiva [PERRL] : pupils equal round and reactive to light [EOMI] : extraocular movements intact [Normal Outer Ear/Nose] : the outer ears and nose were normal in appearance [Normal Oropharynx] : the oropharynx was normal [No JVD] : no jugular venous distention [No Lymphadenopathy] : no lymphadenopathy [Supple] : supple [Thyroid Normal, No Nodules] : the thyroid was normal and there were no nodules present [No Respiratory Distress] : no respiratory distress  [No Accessory Muscle Use] : no accessory muscle use [Clear to Auscultation] : lungs were clear to auscultation bilaterally [Normal Rate] : normal rate  [Regular Rhythm] : with a regular rhythm [Normal S1, S2] : normal S1 and S2 [No Carotid Bruits] : no carotid bruits [No Abdominal Bruit] : a ~M bruit was not heard ~T in the abdomen [No Varicosities] : no varicosities [Pedal Pulses Present] : the pedal pulses are present [No Edema] : there was no peripheral edema [No Palpable Aorta] : no palpable aorta [No Extremity Clubbing/Cyanosis] : no extremity clubbing/cyanosis [Soft] : abdomen soft [Non-distended] : non-distended [No Masses] : no abdominal mass palpated [No HSM] : no HSM [Normal Bowel Sounds] : normal bowel sounds [Normal Posterior Cervical Nodes] : no posterior cervical lymphadenopathy [Normal Anterior Cervical Nodes] : no anterior cervical lymphadenopathy [No CVA Tenderness] : no CVA  tenderness [No Spinal Tenderness] : no spinal tenderness [No Joint Swelling] : no joint swelling [Grossly Normal Strength/Tone] : grossly normal strength/tone [No Rash] : no rash [Coordination Grossly Intact] : coordination grossly intact [No Focal Deficits] : no focal deficits [Normal Gait] : normal gait [Deep Tendon Reflexes (DTR)] : deep tendon reflexes were 2+ and symmetric [Normal Affect] : the affect was normal [Normal Insight/Judgement] : insight and judgment were intact [de-identified] :  with murmur [de-identified] :   No edema was noted today.  No Homans' sign was appreciated. [de-identified] : Right lower quadrant  slight tenderness no rebound or guarding noted.  patient with normal  bowel signs time for

## 2022-09-14 LAB
25(OH)D3 SERPL-MCNC: 36.1 NG/ML
ALBUMIN SERPL ELPH-MCNC: 4.6 G/DL
ALP BLD-CCNC: 69 U/L
ALT SERPL-CCNC: 11 U/L
ANION GAP SERPL CALC-SCNC: 16 MMOL/L
AST SERPL-CCNC: 18 U/L
BASOPHILS # BLD AUTO: 0.02 K/UL
BASOPHILS NFR BLD AUTO: 0.4 %
BILIRUB SERPL-MCNC: 0.3 MG/DL
BUN SERPL-MCNC: 13 MG/DL
CALCIUM SERPL-MCNC: 9.6 MG/DL
CHLORIDE SERPL-SCNC: 100 MMOL/L
CHOLEST SERPL-MCNC: 205 MG/DL
CO2 SERPL-SCNC: 22 MMOL/L
CREAT SERPL-MCNC: 0.92 MG/DL
EGFR: 76 ML/MIN/1.73M2
EOSINOPHIL # BLD AUTO: 0.09 K/UL
EOSINOPHIL NFR BLD AUTO: 1.7 %
GLUCOSE SERPL-MCNC: 91 MG/DL
HCT VFR BLD CALC: 44.4 %
HDLC SERPL-MCNC: 65 MG/DL
HGB BLD-MCNC: 14.6 G/DL
IMM GRANULOCYTES NFR BLD AUTO: 0.2 %
LDLC SERPL CALC-MCNC: 123 MG/DL
LYMPHOCYTES # BLD AUTO: 1.79 K/UL
LYMPHOCYTES NFR BLD AUTO: 33.7 %
MAN DIFF?: NORMAL
MCHC RBC-ENTMCNC: 32.6 PG
MCHC RBC-ENTMCNC: 32.9 GM/DL
MCV RBC AUTO: 99.1 FL
MONOCYTES # BLD AUTO: 0.4 K/UL
MONOCYTES NFR BLD AUTO: 7.5 %
NEUTROPHILS # BLD AUTO: 3 K/UL
NEUTROPHILS NFR BLD AUTO: 56.5 %
NONHDLC SERPL-MCNC: 140 MG/DL
NT-PROBNP SERPL-MCNC: 71 PG/ML
PLATELET # BLD AUTO: 248 K/UL
POTASSIUM SERPL-SCNC: 4.6 MMOL/L
PROT SERPL-MCNC: 7.3 G/DL
RBC # BLD: 4.48 M/UL
RBC # FLD: 13.2 %
SODIUM SERPL-SCNC: 138 MMOL/L
T4 FREE SERPL-MCNC: 1.4 NG/DL
TRIGL SERPL-MCNC: 86 MG/DL
TSH SERPL-ACNC: 1.56 UIU/ML
WBC # FLD AUTO: 5.31 K/UL

## 2022-09-21 ENCOUNTER — NON-APPOINTMENT (OUTPATIENT)
Age: 52
End: 2022-09-21

## 2022-10-05 ENCOUNTER — APPOINTMENT (OUTPATIENT)
Dept: ORTHOPEDIC SURGERY | Facility: CLINIC | Age: 52
End: 2022-10-05

## 2022-10-05 PROCEDURE — 99214 OFFICE O/P EST MOD 30 MIN: CPT

## 2022-10-05 NOTE — HISTORY OF PRESENT ILLNESS
[Lower back] : lower back [Gradual] : gradual [9] : 9 [0] : 0 [Localized] : localized [Stabbing] : stabbing [Intermittent] : intermittent [Household chores] : household chores [Physical therapy] : physical therapy [Sitting] : sitting [Standing] : standing [de-identified] : 8/31/22:  53 yo F  pt presents here today  with  lower spine pain for years  - has "flare ups" - was having spasms on the left side in the spring after playing tennis - had 3x major flare ups over the summer \par \par Pain in the lower back left side - stabbing pain in the left lower back - got really bad few nights ago - was bad about a week before that \par \par Not really going down the legs \par \par tried ice pack \par \par pt has try physical therapy ,chiro care,acupucture,massage therapy in the past with some relief \par No recent injectoins\par No surgery \par pt saw Dr Henriquez back in  2018 got mri done an tpi injection which helped \par \par xrays today:\par L spine - slight curvature\par AP PELVIS - negatie \par \par IBS/hashimotos\par No hx of cancer \par \par 10/05/2022 here to review the mri of the l spine ,doing worse since last visit - plan at last was "severe back pain flare up \par PT\par mobic/flexeril \par indicated for MRi L spine - eval disc pathology \par fu to review the MRi" pain in the left side of the lower back - some degree of symptoms in the left leg \par \par she is set up US of the kidney cyst \par just started with PT \par been using the mobic and also the muscle relaxer \par \par MRI L spine\par 1. Convexity of the lumbar spine towards the left and multilevel degenerative disc disease with left paracentral/foraminal herniation impinging the left S1 nerve root with encroachment upon the left exiting L5 nerve root at L5-S1.\par 2. Asymmetric left foraminal narrowing at L3-L4 and L4-L5 and mild multilevel degenerative disc disease without acute fracture.\par 3. Findings suggesting bilateral parapelvic renal cysts incompletely evaluated on the current exam. Consider ultrasound of the kidneys to further evaluate as clinically indicated. [] : no [FreeTextEntry5] : no injury  [de-identified] : Dr Jose De Jesus Cabello  [de-identified] : x rays and mri done at [facility]  back in 2018,mri done at [facility]  [de-identified] : nothing

## 2022-10-05 NOTE — DISCUSSION/SUMMARY
[de-identified] : reviewed the MRi with her \par suspect the left side disc at L5-S1 is the issue for her \par rec she cont with PT \par would add MDP \par TPi tolerated well \par if not getting better would be a good candidate for LESI

## 2022-10-05 NOTE — PROCEDURE
[Trigger point 1-2 muscle groups] : trigger point 1-2 muscle groups [Left] : of the left [Lumbar paraspinal muscle] : lumbar paraspinal muscle [Pain] : pain [Alcohol] : alcohol [___ cc    1%] : Lidocaine ~Vcc of 1%  [___ cc    0.25%] : Bupivacaine (Marcaine) ~Vcc of 0.25%  [___ cc    10mg] : Triamcinolone (Kenalog) ~Vcc of 10 mg

## 2022-10-07 ENCOUNTER — NON-APPOINTMENT (OUTPATIENT)
Age: 52
End: 2022-10-07

## 2022-10-14 ENCOUNTER — APPOINTMENT (OUTPATIENT)
Dept: INTERNAL MEDICINE | Facility: CLINIC | Age: 52
End: 2022-10-14

## 2022-10-14 VITALS
OXYGEN SATURATION: 95 % | HEIGHT: 69 IN | HEART RATE: 60 BPM | DIASTOLIC BLOOD PRESSURE: 74 MMHG | SYSTOLIC BLOOD PRESSURE: 116 MMHG | TEMPERATURE: 98 F | RESPIRATION RATE: 17 BRPM | WEIGHT: 184 LBS | BODY MASS INDEX: 27.25 KG/M2

## 2022-10-14 DIAGNOSIS — N83.201 UNSPECIFIED OVARIAN CYST, RIGHT SIDE: ICD-10-CM

## 2022-10-14 DIAGNOSIS — N83.202 UNSPECIFIED OVARIAN CYST, RIGHT SIDE: ICD-10-CM

## 2022-10-14 DIAGNOSIS — M85.80 OTHER SPECIFIED DISORDERS OF BONE DENSITY AND STRUCTURE, UNSPECIFIED SITE: ICD-10-CM

## 2022-10-14 DIAGNOSIS — K76.89 OTHER SPECIFIED DISEASES OF LIVER: ICD-10-CM

## 2022-10-14 PROCEDURE — 90686 IIV4 VACC NO PRSV 0.5 ML IM: CPT

## 2022-10-14 PROCEDURE — 99214 OFFICE O/P EST MOD 30 MIN: CPT | Mod: 25

## 2022-10-14 PROCEDURE — G0008: CPT

## 2022-10-15 PROBLEM — N83.201 CYSTS OF BOTH OVARIES: Status: ACTIVE | Noted: 2022-10-15

## 2022-10-15 PROBLEM — K76.89 HEPATIC CYST: Status: ACTIVE | Noted: 2022-10-15

## 2022-10-15 PROBLEM — M85.80 OSTEOPENIA: Status: ACTIVE | Noted: 2022-10-15

## 2022-10-15 NOTE — PLAN
[FreeTextEntry1] : Cardiology\par mildly elevated LDL - likely secondary to patient not fasting during recent blood work-up - recommended rechecking FLP in 3 months\par Endocrinology\par hypothyroidism - continue Levothyroxine Sodium 112mcg p.o.q.o.d. as directed \par Endocrinology/Musculoskeletal\par osteopenia - recommended starting Os-Andrea or Citracal BID with a meal; recommended weight-bearing exercise (walking, tennis)\par Musculoskeletal\par back pain - secondary to disc herniation - continue PT - follow up with orthopedics as necessary \par Neurology\par migraine - continue Nortriptyline HCl 25mg p.o.q.h.s. as directed - continue to follow up with neurologist, Dr. Cosme \par Gastroenterology\par GERD - continue antireflux measures\par Urology\par history of overactive bladder/frequent UTIs - continue to follow up with urologist, Dr. Jovani Gunn  \par Immunization\par flu vaccine - Fluzone Quadrivalent 0.5mL x 1 administered intramuscularly to left deltoid\par S/p COVID-19 Vaccine - recommended following up at pharmacy for updated COVID-19 vaccine booster\par \par Results of recent blood work and imaging studies reviewed in detail with patient.\par Rx given for fasting blood work (FLP); recommended following up at a [facility] in 3 months.

## 2022-10-15 NOTE — ADDENDUM
[FreeTextEntry1] : I, Mahin Pemberton, acted solely as scribe for Dr. Tay Vásquez DO on this date 10/14/2022  3:30PM .\par \par All medical record entries made by the Scribe were at my, Dr. Tay Vásquez DO direction and personally dictated by me on 10/14/2022  3:30PM. I have reviewed the chart and agree that the record accurately reflects my personal performance of the history, physical exam, assessment and plan. I have also personally directed, reviewed and agreed with the chart.

## 2022-10-15 NOTE — PHYSICAL EXAM
[No Acute Distress] : no acute distress [Well Nourished] : well nourished [Well Developed] : well developed [Well-Appearing] : well-appearing [Normal Sclera/Conjunctiva] : normal sclera/conjunctiva [PERRL] : pupils equal round and reactive to light [EOMI] : extraocular movements intact [Normal Outer Ear/Nose] : the outer ears and nose were normal in appearance [Normal Oropharynx] : the oropharynx was normal [No JVD] : no jugular venous distention [No Lymphadenopathy] : no lymphadenopathy [Supple] : supple [Thyroid Normal, No Nodules] : the thyroid was normal and there were no nodules present [No Respiratory Distress] : no respiratory distress  [No Accessory Muscle Use] : no accessory muscle use [Clear to Auscultation] : lungs were clear to auscultation bilaterally [Normal Rate] : normal rate  [Regular Rhythm] : with a regular rhythm [Normal S1, S2] : normal S1 and S2 [No Murmur] : no murmur heard [No Carotid Bruits] : no carotid bruits [No Abdominal Bruit] : a ~M bruit was not heard ~T in the abdomen [No Varicosities] : no varicosities [Pedal Pulses Present] : the pedal pulses are present [No Edema] : there was no peripheral edema [No Palpable Aorta] : no palpable aorta [No Extremity Clubbing/Cyanosis] : no extremity clubbing/cyanosis [Soft] : abdomen soft [Non Tender] : non-tender [Non-distended] : non-distended [No Masses] : no abdominal mass palpated [No HSM] : no HSM [Normal Bowel Sounds] : normal bowel sounds [Normal Posterior Cervical Nodes] : no posterior cervical lymphadenopathy [Normal Anterior Cervical Nodes] : no anterior cervical lymphadenopathy [No CVA Tenderness] : no CVA  tenderness [No Spinal Tenderness] : no spinal tenderness [No Joint Swelling] : no joint swelling [Grossly Normal Strength/Tone] : grossly normal strength/tone [No Rash] : no rash [Coordination Grossly Intact] : coordination grossly intact [No Focal Deficits] : no focal deficits [Normal Gait] : normal gait [Deep Tendon Reflexes (DTR)] : deep tendon reflexes were 2+ and symmetric [Normal Affect] : the affect was normal [Normal Insight/Judgement] : insight and judgment were intact [Normal Voice/Communication] : normal voice/communication [Normal TMs] : both tympanic membranes were normal [Normal Nasal Mucosa] : the nasal mucosa was normal [Normal Supraclavicular Nodes] : no supraclavicular lymphadenopathy [Kyphosis] : no kyphosis [Scoliosis] : no scoliosis [Acne] : no acne [Speech Grossly Normal] : speech grossly normal [Memory Grossly Normal] : memory grossly normal [Alert and Oriented x3] : oriented to person, place, and time [de-identified] : overweight  [Normal Mood] : the mood was normal

## 2022-10-15 NOTE — HISTORY OF PRESENT ILLNESS
[FreeTextEntry1] : general follow-up [de-identified] : Patient is a 52 year old female with a past medical history as below who presents for general follow-up. \par \par Patient states she is taking all medications as prescribed and denies any adverse reactions or side effects. She denies heat/cold intolerance on Levothyroxine Sodium.\par \par Blood work done on 9/12/22 revealed normal CBC, normal CMP, mildly elevated total cholesterol (205), mildly elevated (123), mildly elevated non-HDL cholesterol (140), normal HDL (65), normal triglycerides (86), normal TSH (1.56), normal Free T4 (1.4), normal Vitamin D (36.1), and normal Pro-BNP (71). Patient was not fasting when the lab was done.  \par \par MRI Lumbar Spine dated 9/3/22 had revealed the following: convexity of lumbar spine towards the left and multilevel degenerative disc disease with left paracentral/foraminal herniation impinging the left S1 nerve root with encroachment upon the left exiting L5 nerve root at L5-S1; asymmetric left foraminal narrowing at L3-L4 and L4-L5 and mild multilevel degenerative disc disease without acute fracture; findings suggesting bilateral parapelvic renal cysts incompletely evaluated; US Kidney was recommended for further evaluation. \par \par DEXA Scan dated 10/5/22 revealed osteopenia; T-Score of -1.2 in spine, T-Score of -2.1 in hip; fracture risk assessment yields 10-year probability of major osteoporotic fracture to be 5.4% and hip fracture to be 0.7%. Patient notes FHx of osteoporosis (mother). Patient has not been taking OTC Calcium/Vitamin D-3. Patient has not been exercising as regularly as she had been in the past (tennis, [employer]) secondary to back pain. She has been attending PT for the back pain.\par \par Abdominal US dated 10/5/22 revealed no acute findings; no gallstones; benign-appearing 2.3 cm hepatic cyst demonstrating thin internal septation. Abdominal pain resolved and has not recurred.\par \par Pelvic US dated 10/5/22 revealed 1.2 cm intramural fibroid; no endometrial thickening; tiny benign appearing subcentimeter cysts within each ovary; no suspicious adnexal mass.\par \par Patient inquires about receiving the flu vaccine today. She is vaccinated against COVID-19.

## 2022-10-15 NOTE — ASSESSMENT
[FreeTextEntry1] : Patient is a 52 year old female with a past medical history as above who presents for general follow-up.

## 2022-10-15 NOTE — HEALTH RISK ASSESSMENT
[Never] : Never [Yes] : Yes [2 - 4 times a month (2 pts)] : 2-4 times a month (2 points) [1 or 2 (0 pts)] : 1 or 2 (0 points) [Never (0 pts)] : Never (0 points) [No] : In the past 12 months have you used drugs other than those required for medical reasons? No [No falls in past year] : Patient reported no falls in the past year [0] : 2) Feeling down, depressed, or hopeless: Not at all (0) [PHQ-2 Negative - No further assessment needed] : PHQ-2 Negative - No further assessment needed [Audit-CScore] : 2 [[id number]] : 0 [MammogramDate] : 08/22 [MammogramComments] : No mammographic or sonographic evidence of malignancy; BI-RADS 2: Benign findings.  [PapSmearComments] : NILM.  [PapSmearDate] : 02/22 [BoneDensityComments] : Osteopenia.  [BoneDensityDate] : 10/22 [ColonoscopyDate] : 12/17 [ColonoscopyComments] : Internal hemorrhoids.

## 2022-10-17 ENCOUNTER — MED ADMIN CHARGE (OUTPATIENT)
Age: 52
End: 2022-10-17

## 2022-11-02 ENCOUNTER — APPOINTMENT (OUTPATIENT)
Dept: ORTHOPEDIC SURGERY | Facility: CLINIC | Age: 52
End: 2022-11-02

## 2022-11-02 PROCEDURE — 20552 NJX 1/MLT TRIGGER POINT 1/2: CPT

## 2022-11-02 PROCEDURE — J3490M: CUSTOM

## 2022-11-02 PROCEDURE — 99214 OFFICE O/P EST MOD 30 MIN: CPT | Mod: 25

## 2022-11-02 NOTE — HISTORY OF PRESENT ILLNESS
[Lower back] : lower back [Gradual] : gradual [Sudden] : sudden [5] : 5 [4] : 4 [Burning] : burning [Localized] : localized [Shooting] : shooting [Stabbing] : stabbing [Intermittent] : intermittent [Household chores] : household chores [Physical therapy] : physical therapy [Sitting] : sitting [Standing] : standing [de-identified] : 8/31/22:  53 yo F  pt presents here today  with  lower spine pain for years  - has "flare ups" - was having spasms on the left side in the spring after playing tennis - had 3x major flare ups over the summer \par \par Pain in the lower back left side - stabbing pain in the left lower back - got really bad few nights ago - was bad about a week before that \par \par Not really going down the legs \par \par tried ice pack \par \par pt has try physical therapy ,chiro care,acupucture,massage therapy in the past with some relief \par No recent injectoins\par No surgery \par pt saw Dr Henriquez back in  2018 got mri done an tpi injection which helped \par \par xrays today:\par L spine - slight curvature\par AP PELVIS - negatie \par \par IBS/hashimotos\par No hx of cancer \par \par 10/05/2022 here to review the mri of the l spine ,doing worse since last visit - plan at last was "severe back pain flare up \par PT\par mobic/flexeril \par indicated for MRi L spine - eval disc pathology \par fu to review the MRi" pain in the left side of the lower back - some degree of symptoms in the left leg \par \par she is set up US of the kidney cyst \par just started with PT \par been using the mobic and also the muscle relaxer \par \par MRI L spine\par 1. Convexity of the lumbar spine towards the left and multilevel degenerative disc disease with left paracentral/foraminal herniation impinging the left S1 nerve root with encroachment upon the left exiting L5 nerve root at L5-S1.\par 2. Asymmetric left foraminal narrowing at L3-L4 and L4-L5 and mild multilevel degenerative disc disease without acute fracture.\par 3. Findings suggesting bilateral parapelvic renal cysts incompletely evaluated on the current exam. Consider ultrasound of the kidneys to further evaluate as clinically indicated.\par \par 11/2/22: Here for follow up. Cont with PT and medications. Plan at last was "reviewed the MRi with her \par suspect the left side disc at L5-S1 is the issue for her \par rec she cont with PT \par would add MDP \par TPi tolerated well \par if not getting better would be a good candidate for LESI " - overall  the pain worse in the lower back  [] : Post Surgical Visit: no [FreeTextEntry1] : l spine  [FreeTextEntry5] : pt had re injured her lower back after playing tennis the other day as well as recent pain in her left shin down to her toe that pt believes is coming from her lower back  [de-identified] : Dr Jose De Jesus Cabello  [de-identified] : x rays and mri done at [facility]  back in 2018,mri done at [facility]  [de-identified] : none

## 2022-11-02 NOTE — PROCEDURE
[Trigger point 1-2 muscle groups] : trigger point 1-2 muscle groups [Left] : of the left [Lumbar paraspinal muscle] : lumbar paraspinal muscle [Pain] : pain [Alcohol] : alcohol [___ cc    1%] : Lidocaine ~Vcc of 1%  [___ cc    0.25%] : Bupivacaine (Marcaine) ~Vcc of 0.25%  [___ cc    10mg] : Triamcinolone (Kenalog) ~Vcc of 10 mg  [Betadine] : betadine [Ethyl Chloride sprayed topically] : ethyl chloride sprayed topically

## 2022-11-02 NOTE — DISCUSSION/SUMMARY
[de-identified] : reviewed the case \par discussion of the tx options \par good candidate for LESI on the left at L5-S1 \par TPi tolerated well \par cont with pt

## 2022-12-21 ENCOUNTER — APPOINTMENT (OUTPATIENT)
Dept: ORTHOPEDIC SURGERY | Facility: CLINIC | Age: 52
End: 2022-12-21

## 2022-12-21 PROCEDURE — 99214 OFFICE O/P EST MOD 30 MIN: CPT

## 2022-12-21 NOTE — PROCEDURE
[Lumbar paraspinal muscle] : lumbar paraspinal muscle [Pain] : pain [Alcohol] : alcohol [Betadine] : betadine [Ethyl Chloride sprayed topically] : ethyl chloride sprayed topically [___ cc    1%] : Lidocaine ~Vcc of 1%  [___ cc    0.25%] : Bupivacaine (Marcaine) ~Vcc of 0.25%  [___ cc    10mg] : Triamcinolone (Kenalog) ~Vcc of 10 mg

## 2022-12-21 NOTE — HISTORY OF PRESENT ILLNESS
[Lower back] : lower back [Gradual] : gradual [Sudden] : sudden [5] : 5 [4] : 4 [Burning] : burning [Localized] : localized [Shooting] : shooting [Stabbing] : stabbing [Intermittent] : intermittent [Household chores] : household chores [Physical therapy] : physical therapy [Sitting] : sitting [Standing] : standing [de-identified] : 8/31/22:  53 yo F  pt presents here today  with  lower spine pain for years  - has "flare ups" - was having spasms on the left side in the spring after playing tennis - had 3x major flare ups over the summer \par \par Pain in the lower back left side - stabbing pain in the left lower back - got really bad few nights ago - was bad about a week before that \par \par Not really going down the legs \par \par tried ice pack \par \par pt has try physical therapy ,chiro care,acupucture,massage therapy in the past with some relief \par No recent injectoins\par No surgery \par pt saw Dr Henriquez back in  2018 got mri done an tpi injection which helped \par \par xrays today:\par L spine - slight curvature\par AP PELVIS - negatie \par \par IBS/hashimotos\par No hx of cancer \par \par 10/05/2022 here to review the mri of the l spine ,doing worse since last visit - plan at last was "severe back pain flare up \par PT\par mobic/flexeril \par indicated for MRi L spine - eval disc pathology \par fu to review the MRi" pain in the left side of the lower back - some degree of symptoms in the left leg \par \par she is set up US of the kidney cyst \par just started with PT \par been using the mobic and also the muscle relaxer \par \par MRI L spine\par 1. Convexity of the lumbar spine towards the left and multilevel degenerative disc disease with left paracentral/foraminal herniation impinging the left S1 nerve root with encroachment upon the left exiting L5 nerve root at L5-S1.\par 2. Asymmetric left foraminal narrowing at L3-L4 and L4-L5 and mild multilevel degenerative disc disease without acute fracture.\par 3. Findings suggesting bilateral parapelvic renal cysts incompletely evaluated on the current exam. Consider ultrasound of the kidneys to further evaluate as clinically indicated.\par \par 11/2/22: Here for follow up. Cont with PT and medications. Plan at last was "reviewed the MRi with her \par suspect the left side disc at L5-S1 is the issue for her \par rec she cont with PT \par would add MDP \par TPi tolerated well \par if not getting better would be a good candidate for LESI " - overall  the pain worse in the lower back \par \par \par 12/21/22 here for a follow up on the lower spine ,doing acupuncture which helps,pain is now on the center /left of the back ,tpi injection did not help - worse with standing for any length of time - tender around the middle back - most of the pain in the lower back and at times into the legs when the back gets bad \par \par has tried mobic and flexeril \par \par went to accupuncture \par \par has some cramping in the right foot  [] : Post Surgical Visit: no [FreeTextEntry1] : l spine  [FreeTextEntry5] : pt had re injured her lower back after playing tennis the other day as well as recent pain in her left shin down to her toe that pt believes is coming from her lower back  [de-identified] : Dr Jose De Jesus Cabello  [de-identified] : x rays and mri done at [facility]  back in 2018,mri done at [facility]  [de-identified] : tpi injection,acupuncture

## 2022-12-21 NOTE — DISCUSSION/SUMMARY
[de-identified] : reviewed the case \par discussion of the tx options \par good candidate for LESI on the left at L5-S1 \par \par cont with PT

## 2022-12-30 ENCOUNTER — NON-APPOINTMENT (OUTPATIENT)
Age: 52
End: 2022-12-30

## 2023-01-11 ENCOUNTER — APPOINTMENT (OUTPATIENT)
Dept: INTERNAL MEDICINE | Facility: CLINIC | Age: 53
End: 2023-01-11
Payer: COMMERCIAL

## 2023-01-11 VITALS
DIASTOLIC BLOOD PRESSURE: 70 MMHG | OXYGEN SATURATION: 99 % | WEIGHT: 178 LBS | RESPIRATION RATE: 16 BRPM | SYSTOLIC BLOOD PRESSURE: 100 MMHG | HEART RATE: 69 BPM | HEIGHT: 69 IN | BODY MASS INDEX: 26.36 KG/M2 | TEMPERATURE: 97.7 F

## 2023-01-11 DIAGNOSIS — Z11.59 ENCOUNTER FOR SCREENING FOR OTHER VIRAL DISEASES: ICD-10-CM

## 2023-01-11 PROCEDURE — 99213 OFFICE O/P EST LOW 20 MIN: CPT | Mod: 25

## 2023-01-11 RX ORDER — METHYLPREDNISOLONE 4 MG/1
4 TABLET ORAL
Qty: 1 | Refills: 0 | Status: COMPLETED | COMMUNITY
Start: 2022-10-05 | End: 2023-01-11

## 2023-01-11 RX ORDER — BENZONATATE 200 MG/1
200 CAPSULE ORAL 3 TIMES DAILY
Qty: 30 | Refills: 0 | Status: COMPLETED | COMMUNITY
Start: 2023-01-11 | End: 2023-01-21

## 2023-01-11 NOTE — HEALTH RISK ASSESSMENT
[Never] : Never [Yes] : Yes [2 - 4 times a month (2 pts)] : 2-4 times a month (2 points) [1 or 2 (0 pts)] : 1 or 2 (0 points) [Never (0 pts)] : Never (0 points) [No] : In the past 12 months have you used drugs other than those required for medical reasons? No [No falls in past year] : Patient reported no falls in the past year [0] : 2) Feeling down, depressed, or hopeless: Not at all (0) [PHQ-2 Negative - No further assessment needed] : PHQ-2 Negative - No further assessment needed [Audit-CScore] : 2 [[id number]] : 0

## 2023-01-11 NOTE — HISTORY OF PRESENT ILLNESS
[Cold Symptoms] : cold symptoms [Moderate] : moderate [___ Weeks ago] :  [unfilled] weeks ago [Gradual] : gradually [Constant] : constant [Congestion] : congestion [Cough] : cough [At Night] : at night [Stable] : stable [Sore Throat] : no sore throat [Wheezing] : no wheezing [Shortness Of Breath] : no shortness of breath [Earache] : no earache [Fever] : no fever [FreeTextEntry2] : ear pressure  [FreeTextEntry8] : Ms. KENNEDY is a 52 year  female, with a past medical history as noted below,who present to the office  today for evaluation of nasal congestion cough and ear pressure.  Patient states about a month ago was in [place] when she started with cold symptoms that progressed over a week or so.  Eventually went to urgent care was diagnosed with bronchitis was placed on doxycycline and Medrol Dosepak which she finished.  Still with a cough and congestion.  Also under some stress secondary to losing a friend so feel rundown.  Denies having fever chills or night sweats.  Does have sinus congestion or sinus headaches that seem to be increasing over the last few days.  After she finished the antibiotic she states the cough has significantly improved but has not resolved.\par \par Does state around late December she took a home COVID test which was negative

## 2023-01-11 NOTE — PHYSICAL EXAM
[No Acute Distress] : no acute distress [Well Nourished] : well nourished [Well Developed] : well developed [Well-Appearing] : well-appearing [Normal Sclera/Conjunctiva] : normal sclera/conjunctiva [PERRL] : pupils equal round and reactive to light [EOMI] : extraocular movements intact [Normal Outer Ear/Nose] : the outer ears and nose were normal in appearance [Normal Oropharynx] : the oropharynx was normal [No JVD] : no jugular venous distention [No Lymphadenopathy] : no lymphadenopathy [Supple] : supple [No Respiratory Distress] : no respiratory distress  [No Accessory Muscle Use] : no accessory muscle use [Clear to Auscultation] : lungs were clear to auscultation bilaterally [Normal Rate] : normal rate  [Regular Rhythm] : with a regular rhythm [Normal S1, S2] : normal S1 and S2 [No Carotid Bruits] : no carotid bruits [No Abdominal Bruit] : a ~M bruit was not heard ~T in the abdomen [No Varicosities] : no varicosities [Pedal Pulses Present] : the pedal pulses are present [No Edema] : there was no peripheral edema [No Palpable Aorta] : no palpable aorta [No Extremity Clubbing/Cyanosis] : no extremity clubbing/cyanosis [Soft] : abdomen soft [Non-distended] : non-distended [No Masses] : no abdominal mass palpated [No HSM] : no HSM [Normal Bowel Sounds] : normal bowel sounds [Normal Posterior Cervical Nodes] : no posterior cervical lymphadenopathy [Normal Anterior Cervical Nodes] : no anterior cervical lymphadenopathy [No CVA Tenderness] : no CVA  tenderness [No Spinal Tenderness] : no spinal tenderness [No Joint Swelling] : no joint swelling [Grossly Normal Strength/Tone] : grossly normal strength/tone [No Rash] : no rash [Coordination Grossly Intact] : coordination grossly intact [No Focal Deficits] : no focal deficits [Normal Gait] : normal gait [Deep Tendon Reflexes (DTR)] : deep tendon reflexes were 2+ and symmetric [Normal Affect] : the affect was normal [Normal Insight/Judgement] : insight and judgment were intact [Normal TMs] : both tympanic membranes were normal [Speech Grossly Normal] : speech grossly normal [Alert and Oriented x3] : oriented to person, place, and time [Normal Mood] : the mood was normal [de-identified] : Clear nasal congestion [de-identified] :  with murmur [de-identified] : As per gynecology [de-identified] : Right lower quadrant  slight tenderness no rebound or guarding noted.  patient with normal  bowel signs time for [de-identified] : As per gynecology

## 2023-01-11 NOTE — REVIEW OF SYSTEMS
[Hot Flashes] : hot flashes [Nasal Discharge] : nasal discharge [Cough] : cough [Negative] : Gastrointestinal [Earache] : no earache [Hearing Loss] : no hearing loss [Palpitations] : no palpitations [Lower Ext Edema] : no lower extremity edema [Constipation] : no constipation [Diarrhea] : diarrhea [Itching] : no itching [Skin Rash] : no skin rash [Headache] : no headache [Dizziness] : no dizziness [Fainting] : no fainting [Memory Loss] : no memory loss [Suicidal] : not suicidal [Insomnia] : no insomnia [Anxiety] : no anxiety [Depression] : no depression [Easy Bruising] : no easy bruising [Swollen Glands] : no swollen glands [FreeTextEntry4] : Sinus congestion, sinus pressure, ear popping [FreeTextEntry8] : overactive bladder

## 2023-01-11 NOTE — ASSESSMENT
[FreeTextEntry1] :   A 52-year-old female, who presents to the office for evaluation of a persistent cough and nasal congestion status post treatment with Medrol Dosepak and doxycycline

## 2023-01-11 NOTE — PLAN
[FreeTextEntry1] : Pulmonary-bronchitis resolving-advised patient to start Flonase nasal spray 2 sprays each nostril once at night as well as benzonatate 200 mg 1 tab p.o. 3 times daily as needed cough.  Advised patient increase fluid intake and rest.  We will check RSV, flu and COVID PCR.\par Advised patient if cough is not improving will need to follow-up with a chest x-ray.\par If fever develops to call office immediately.\par \par Endocrinology\par hypothyroidism - continue Levothyroxine Sodium 112mcg p.o.q.o.d. as directed -  \par \par Gynecology\par hot flashes - likely secondary to menopause  advised to follow-up with gynecology for routine Pap smears.  Clinical breast exam.\par \par Neurology\par migraine - continue Nortriptyline HCl 25mg p.o.q.h.s. as directed - continue to follow up with neurologist, Dr. Cosme \par \par \par I spent 25   Minutes with the patient, half of which we discussed finding on physical exam  and coordinated care.  As well as reviewed my plans and follow ups. \par  \par   All patient's questions and concerns were addressed

## 2023-01-12 ENCOUNTER — TRANSCRIPTION ENCOUNTER (OUTPATIENT)
Age: 53
End: 2023-01-12

## 2023-01-12 ENCOUNTER — NON-APPOINTMENT (OUTPATIENT)
Age: 53
End: 2023-01-12

## 2023-01-12 LAB
INFLUENZA A RESULT: NOT DETECTED
INFLUENZA B RESULT: NOT DETECTED
RESP SYN VIRUS RESULT: NOT DETECTED
SARS-COV-2 RESULT: NOT DETECTED

## 2023-02-01 ENCOUNTER — APPOINTMENT (OUTPATIENT)
Dept: PAIN MANAGEMENT | Facility: CLINIC | Age: 53
End: 2023-02-01
Payer: COMMERCIAL

## 2023-02-01 ENCOUNTER — APPOINTMENT (OUTPATIENT)
Dept: PAIN MANAGEMENT | Facility: CLINIC | Age: 53
End: 2023-02-01

## 2023-02-01 PROCEDURE — 99212 OFFICE O/P EST SF 10 MIN: CPT | Mod: 95

## 2023-02-01 NOTE — HISTORY OF PRESENT ILLNESS
[Home] : at home, [unfilled] , at the time of the visit. [Medical Office: ([facility])___] : at the medical office located in  [Verbal consent obtained from patient] : the patient, [unfilled] [FreeTextEntry1] : Pt returns today via TTM.\par Taking Nortriptyline 25mg at night. Tolerates it well , denies any adverse effects. Is very pleased with how she is doing .\par Denies any new health issues. \par Will take Aleve to abort migraine which occurs 1-2x/ month

## 2023-04-19 ENCOUNTER — RESULT CHARGE (OUTPATIENT)
Age: 53
End: 2023-04-19

## 2023-04-20 ENCOUNTER — NON-APPOINTMENT (OUTPATIENT)
Age: 53
End: 2023-04-20

## 2023-04-20 ENCOUNTER — APPOINTMENT (OUTPATIENT)
Dept: INTERNAL MEDICINE | Facility: CLINIC | Age: 53
End: 2023-04-20
Payer: COMMERCIAL

## 2023-04-20 VITALS
RESPIRATION RATE: 16 BRPM | WEIGHT: 181 LBS | DIASTOLIC BLOOD PRESSURE: 80 MMHG | BODY MASS INDEX: 26.81 KG/M2 | OXYGEN SATURATION: 98 % | HEART RATE: 80 BPM | TEMPERATURE: 98 F | SYSTOLIC BLOOD PRESSURE: 110 MMHG | HEIGHT: 69 IN

## 2023-04-20 DIAGNOSIS — Z01.818 ENCOUNTER FOR OTHER PREPROCEDURAL EXAMINATION: ICD-10-CM

## 2023-04-20 DIAGNOSIS — R07.89 OTHER CHEST PAIN: ICD-10-CM

## 2023-04-20 DIAGNOSIS — Z82.49 FAMILY HISTORY OF ISCHEMIC HEART DISEASE AND OTHER DISEASES OF THE CIRCULATORY SYSTEM: ICD-10-CM

## 2023-04-20 DIAGNOSIS — Z87.898 PERSONAL HISTORY OF OTHER SPECIFIED CONDITIONS: ICD-10-CM

## 2023-04-20 PROCEDURE — 93000 ELECTROCARDIOGRAM COMPLETE: CPT | Mod: 59

## 2023-04-20 PROCEDURE — 99214 OFFICE O/P EST MOD 30 MIN: CPT | Mod: 25

## 2023-04-20 PROCEDURE — 36415 COLL VENOUS BLD VENIPUNCTURE: CPT

## 2023-04-21 ENCOUNTER — NON-APPOINTMENT (OUTPATIENT)
Age: 53
End: 2023-04-21

## 2023-04-21 LAB
ALBUMIN SERPL ELPH-MCNC: 4.9 G/DL
ALP BLD-CCNC: 63 U/L
ALT SERPL-CCNC: 11 U/L
ANION GAP SERPL CALC-SCNC: 14 MMOL/L
AST SERPL-CCNC: 20 U/L
BASOPHILS # BLD AUTO: 0.02 K/UL
BASOPHILS NFR BLD AUTO: 0.3 %
BILIRUB SERPL-MCNC: 0.4 MG/DL
BUN SERPL-MCNC: 15 MG/DL
CALCIUM SERPL-MCNC: 9.6 MG/DL
CHLORIDE SERPL-SCNC: 102 MMOL/L
CO2 SERPL-SCNC: 24 MMOL/L
CREAT SERPL-MCNC: 0.96 MG/DL
EGFR: 72 ML/MIN/1.73M2
EOSINOPHIL # BLD AUTO: 0.05 K/UL
EOSINOPHIL NFR BLD AUTO: 0.9 %
GLUCOSE SERPL-MCNC: 92 MG/DL
HCG SERPL-MCNC: 4 MIU/ML
HCT VFR BLD CALC: 45.8 %
HGB BLD-MCNC: 15.1 G/DL
IMM GRANULOCYTES NFR BLD AUTO: 0.2 %
LYMPHOCYTES # BLD AUTO: 1.8 K/UL
LYMPHOCYTES NFR BLD AUTO: 31.1 %
MAN DIFF?: NORMAL
MCHC RBC-ENTMCNC: 32.5 PG
MCHC RBC-ENTMCNC: 33 GM/DL
MCV RBC AUTO: 98.7 FL
MONOCYTES # BLD AUTO: 0.44 K/UL
MONOCYTES NFR BLD AUTO: 7.6 %
NEUTROPHILS # BLD AUTO: 3.46 K/UL
NEUTROPHILS NFR BLD AUTO: 59.9 %
PLATELET # BLD AUTO: 245 K/UL
POTASSIUM SERPL-SCNC: 4.4 MMOL/L
PROT SERPL-MCNC: 7.5 G/DL
RBC # BLD: 4.64 M/UL
RBC # FLD: 12.8 %
SODIUM SERPL-SCNC: 141 MMOL/L
WBC # FLD AUTO: 5.78 K/UL

## 2023-04-21 NOTE — HISTORY OF PRESENT ILLNESS
[Chronic Anticoagulation] : chronic anticoagulation [(Patient denies any chest pain, claudication, dyspnea on exertion, orthopnea, palpitations or syncope)] : Patient denies any chest pain, claudication, dyspnea on exertion, orthopnea, palpitations or syncope [Aortic Stenosis] : no aortic stenosis [Atrial Fibrillation] : no atrial fibrillation [Coronary Artery Disease] : no coronary artery disease [Recent Myocardial Infarction] : no recent myocardial infarction [Implantable Device/Pacemaker] : no implantable device/pacemaker [Asthma] : no asthma [COPD] : no COPD [Sleep Apnea] : no sleep apnea [Smoker] : not a smoker [Family Member] : no family member with adverse anesthesia reaction/sudden death [Self] : no previous adverse anesthesia reaction [Chronic Kidney Disease] : no chronic kidney disease [Diabetes] : no diabetes [FreeTextEntry1] : Blepharoplasty of B/L upper lids  [FreeTextEntry2] : 05/03/23 [FreeTextEntry3] : Dr. Espinosa [FreeTextEntry4] : Mrs. KENNEDY is a 52 year  female, with a past medical history as noted below,who present to the office  today for Medical clearance.\par Patient states seen by ophthalmology and was having difficulty with peripheral vision.  Diagnosed with ptosis of bilateral upper eyelids.  Patient denies having chest pain, shortness of breath, nausea or vomiting.  Has intermittent lower back pain which she takes cyclobenzaprine and meloxicam for on a as needed basis.  Patient states she is in menopause last menstrual period was last July [FreeTextEntry5] : positive prothrombin gene mutation and anticardiolipin ab.

## 2023-04-21 NOTE — REVIEW OF SYSTEMS
[Fatigue] : fatigue [Vision Problems] : vision problems [Back Pain] : back pain [Negative] : Heme/Lymph [Fever] : no fever [Chills] : no chills [Discharge] : no discharge [Pain] : no pain [Redness] : no redness [Earache] : no earache [Nosebleed] : no nosebleeds [Sore Throat] : no sore throat [Chest Pain] : no chest pain [Leg Claudication] : no leg claudication [Lower Ext Edema] : no lower extremity edema [Paroxysmal Nocturnal Dyspnea] : no paroxysmal nocturnal dyspnea [Shortness Of Breath] : no shortness of breath [Wheezing] : no wheezing [Cough] : no cough [Dyspnea on Exertion] : no dyspnea on exertion [Abdominal Pain] : no abdominal pain [Nausea] : no nausea [Constipation] : no constipation [Diarrhea] : diarrhea [Vomiting] : no vomiting [Heartburn] : no heartburn [Melena] : no melena [Dysuria] : no dysuria [Hematuria] : no hematuria [Joint Pain] : no joint pain [Muscle Pain] : no muscle pain [Skin Rash] : no skin rash [Headache] : no headache [Dizziness] : no dizziness [Fainting] : no fainting [Anxiety] : no anxiety [Depression] : no depression [Easy Bleeding] : no easy bleeding [Easy Bruising] : no easy bruising [Swollen Glands] : no swollen glands [FreeTextEntry3] : reading glass - decrease peripheral vision  [FreeTextEntry7] : occasional constipation

## 2023-04-21 NOTE — PLAN
[FreeTextEntry1] : Patient history, physical and ancillary testing was reviewed by  Dr. Tay Vásquez. \par FROILAN KENNEDY  was advised not to take any NSAIDs, ASA, Vitamin E, omega 3, ginkgo biloba or MVI 7 days prior to the surgery. \par \par Froilan Was advised not to eat after midnight the night prior to her surgical procedure\par Patient was advised to take levothyroxine  the morning of her surgery with a sip of water.\par Patient in optimal condition for proposed procedure and anesthesia

## 2023-04-21 NOTE — PHYSICAL EXAM
[No Acute Distress] : no acute distress [Well Nourished] : well nourished [Well Developed] : well developed [Well-Appearing] : well-appearing [Normal Sclera/Conjunctiva] : normal sclera/conjunctiva [PERRL] : pupils equal round and reactive to light [EOMI] : extraocular movements intact [Normal Outer Ear/Nose] : the outer ears and nose were normal in appearance [Normal Oropharynx] : the oropharynx was normal [No JVD] : no jugular venous distention [No Lymphadenopathy] : no lymphadenopathy [Supple] : supple [Thyroid Normal, No Nodules] : the thyroid was normal and there were no nodules present [No Respiratory Distress] : no respiratory distress  [No Accessory Muscle Use] : no accessory muscle use [Clear to Auscultation] : lungs were clear to auscultation bilaterally [Normal Rate] : normal rate  [Regular Rhythm] : with a regular rhythm [Normal S1, S2] : normal S1 and S2 [No Carotid Bruits] : no carotid bruits [No Abdominal Bruit] : a ~M bruit was not heard ~T in the abdomen [Pedal Pulses Present] : the pedal pulses are present [No Edema] : there was no peripheral edema [No Palpable Aorta] : no palpable aorta [No Extremity Clubbing/Cyanosis] : no extremity clubbing/cyanosis [Soft] : abdomen soft [Non Tender] : non-tender [Non-distended] : non-distended [No Masses] : no abdominal mass palpated [No HSM] : no HSM [Normal Bowel Sounds] : normal bowel sounds [Normal Posterior Cervical Nodes] : no posterior cervical lymphadenopathy [Normal Anterior Cervical Nodes] : no anterior cervical lymphadenopathy [No CVA Tenderness] : no CVA  tenderness [No Spinal Tenderness] : no spinal tenderness [No Joint Swelling] : no joint swelling [Grossly Normal Strength/Tone] : grossly normal strength/tone [No Rash] : no rash [Coordination Grossly Intact] : coordination grossly intact [No Focal Deficits] : no focal deficits [Normal Gait] : normal gait [Deep Tendon Reflexes (DTR)] : deep tendon reflexes were 2+ and symmetric [Normal Affect] : the affect was normal [Normal Insight/Judgement] : insight and judgment were intact [Speech Grossly Normal] : speech grossly normal [Normal Mood] : the mood was normal [de-identified] :  ptosis upper eye  lids [de-identified] :  as per gynecology [de-identified] :  as per gynecology [FreeTextEntry1] :  as per gynecology

## 2023-04-21 NOTE — RESULTS/DATA
[] : results reviewed [de-identified] : White blood cell count 5.78, hemoglobin 15.1, hematocrit 45.8 (high), platelet count 245 [de-identified] :  sodium was 141, potassium 4.4, chloride 102, blood glucose 92, creatinine 0.96, BUN 15, calcium 9.6, ALT 20 AST 11 [de-identified] : Sinus rhythm  rate 61  [de-identified] :  hCG was negative

## 2023-04-21 NOTE — ASSESSMENT
[Patient Optimized for Surgery] : Patient optimized for surgery [No Further Testing Recommended] : no further testing recommended [Modify anti-platelet treatment prior to procedure] : Modify anti-platelet treatment prior to procedure [Modify medications prior to procedure] : Modify medications prior to procedure [FreeTextEntry4] : Ms. KENNEDY is a 52 year  female, with a past medical history as noted above ,who present to the office  today for medical clearance  [FreeTextEntry6] : hold ASA 2 days prior to procedure  [FreeTextEntry7] : hold mobic 5 days prior  no MVI 5 days prior

## 2023-05-05 ENCOUNTER — EMERGENCY (EMERGENCY)
Facility: HOSPITAL | Age: 53
LOS: 1 days | Discharge: HOME | End: 2023-05-05
Attending: EMERGENCY MEDICINE | Admitting: EMERGENCY MEDICINE
Payer: COMMERCIAL

## 2023-05-05 VITALS
DIASTOLIC BLOOD PRESSURE: 78 MMHG | TEMPERATURE: 98 F | SYSTOLIC BLOOD PRESSURE: 125 MMHG | OXYGEN SATURATION: 97 % | RESPIRATION RATE: 18 BRPM | WEIGHT: 178.57 LBS | HEIGHT: 68 IN | HEART RATE: 61 BPM

## 2023-05-05 VITALS
DIASTOLIC BLOOD PRESSURE: 72 MMHG | RESPIRATION RATE: 18 BRPM | HEART RATE: 67 BPM | OXYGEN SATURATION: 97 % | SYSTOLIC BLOOD PRESSURE: 111 MMHG

## 2023-05-05 PROCEDURE — 93971 EXTREMITY STUDY: CPT | Mod: 26,LT

## 2023-05-05 PROCEDURE — 99284 EMERGENCY DEPT VISIT MOD MDM: CPT

## 2023-05-05 PROCEDURE — 99284 EMERGENCY DEPT VISIT MOD MDM: CPT | Mod: 25

## 2023-05-05 PROCEDURE — 93971 EXTREMITY STUDY: CPT

## 2023-05-05 RX ORDER — MELOXICAM 15 MG/1
1 TABLET ORAL
Refills: 0 | DISCHARGE

## 2023-05-05 RX ORDER — ASPIRIN/CALCIUM CARB/MAGNESIUM 324 MG
1 TABLET ORAL
Refills: 0 | DISCHARGE

## 2023-05-05 RX ORDER — CYCLOBENZAPRINE HYDROCHLORIDE 10 MG/1
1 TABLET, FILM COATED ORAL
Refills: 0 | DISCHARGE

## 2023-05-05 RX ORDER — NORTRIPTYLINE HYDROCHLORIDE 10 MG/1
1 CAPSULE ORAL
Refills: 0 | DISCHARGE

## 2023-05-05 RX ORDER — LEVOTHYROXINE SODIUM 125 MCG
1 TABLET ORAL
Refills: 0 | DISCHARGE

## 2023-05-05 NOTE — ED PROVIDER NOTE - OBJECTIVE STATEMENT
52-year-old female history of prothrombin mutation hypothyroid blepharoplasty surgery 2 days ago now having left lateral lower leg pain tonight concern for a DVT.  Denies any chest pain shortness of breath.  Denies any history of having a DVT or PE.

## 2023-05-05 NOTE — ED PROVIDER NOTE - CLINICAL SUMMARY MEDICAL DECISION MAKING FREE TEXT BOX
52-year-old female history of prothrombin mutation hypothyroid blepharoplasty surgery 2 days ago now having left lateral lower leg pain tonight concern for a DVT.  Denies any chest pain shortness of breath.  Denies any history of having a DVT or PE.    r/o dvt, US LLE

## 2023-05-05 NOTE — ED ADULT NURSE NOTE - NSICDXPASTMEDICALHX_GEN_ALL_CORE_FT
PAST MEDICAL HISTORY:  Anti-cardiolipin antibody syndrome     H/O prothrombin mutation     Hypothyroid

## 2023-05-05 NOTE — ED PROVIDER NOTE - NSFOLLOWUPINSTRUCTIONS_ED_ALL_ED_FT
1) Follow-up with your Primary Medical Doctor or referred doctor. Call today / next business day for prompt follow-up.  2) Return to Emergency room for any worsening or persistent pain, weakness, fever, or any other concerning symptoms.  3) See attached instruction sheets for additional information, including information regarding signs and symptoms to look out for, reasons to seek immediate care and other important instructions.  4) Follow-up with any specialists as discussed / noted as well.     Leg cramps occur when one or more muscles tighten and a person has no control over it (involuntary muscle contraction). Muscle cramps are most common in the calf muscles of the leg. They can occur during exercise or at rest. Leg cramps are painful, and they may last for a few seconds to a few minutes. Cramps may return several times before they finally stop.    Usually, leg cramps are not caused by a serious medical problem. In many cases, the cause is not known. Some common causes include:  Excessive physical effort (overexertion), such as during intense exercise.  Doing the same motion over and over.  Staying in a certain position for a long period of time.  Improper preparation, form, or technique while doing a sport or an activity.  Dehydration.  Injury.  Side effects of certain medicines.  Abnormally low levels of minerals in your blood (electrolytes), especially potassium and calcium. This could result from:  Pregnancy.  Taking diuretic medicines.  Follow these instructions at home:  Eating and drinking    Drink enough fluid to keep your urine pale yellow. Staying hydrated may help prevent cramps.  Eat a healthy diet that includes plenty of nutrients to help your muscles function. A healthy diet includes fruits and vegetables, lean protein, whole grains, and low-fat or nonfat dairy products.  Managing pain, stiffness, and swelling        Try massaging, stretching, and relaxing the affected muscle. Do this for several minutes at a time.  If directed, put ice on areas that are sore or painful after a cramp. To do this:  Put ice in a plastic bag.  Place a towel between your skin and the bag.  Leave the ice on for 20 minutes, 2–3 times a day.  Remove the ice if your skin turns bright red. This is very important. If you cannot feel pain, heat, or cold, you have a greater risk of damage to the area.  If directed, apply heat to muscles that are tense or tight. Do this before you exercise, or as often as told by your health care provider. Use the heat source that your health care provider recommends, such as a moist heat pack or a heating pad. To do this:  Place a towel between your skin and the heat source.  Leave the heat on for 20–30 minutes.  Remove the heat if your skin turns bright red. This is especially important if you are unable to feel pain, heat, or cold. You may have a greater risk of getting burned.  Try taking hot showers or baths to help relax tight muscles.  General instructions    If you are having frequent leg cramps, avoid intense exercise for several days.  Take over-the-counter and prescription medicines only as told by your health care provider.  Keep all follow-up visits. This is important.  Contact a health care provider if:  Your leg cramps get more severe or more frequent, or they do not improve over time.  Your foot becomes cold, numb, or blue.  Summary  Muscle cramps can develop in any muscle, but the most common place is in the calf muscles of the leg.  Leg cramps are painful, and they may last for a few seconds to a few minutes.  Usually, leg cramps are not caused by a serious medical problem. Often, the cause is not known.  Stay hydrated, and take over-the-counter and prescription medicines only as told by your health care provider.  This information is not intended to replace advice given to you by your health care provider. Make sure you discuss any questions you have with your health care provider.

## 2023-05-05 NOTE — ED PROVIDER NOTE - PHYSICAL EXAMINATION
Gen: Alert, NAD  Head/eyes: NC/AT, PERRL  ENT: airway patent  Neck: supple  Pulm/lung: Bilateral clear BS  CV/heart: RRR  GI/Abd: soft, NT/ND, +BS, no guarding/rebound tenderness  Musculoskeletal: no edema/erythema/cyanosis  Skin: no rash, no erythema, no leg swelling  Neuro: AAOx3, grossly intact

## 2023-05-05 NOTE — ED PROVIDER NOTE - PATIENT PORTAL LINK FT
You can access the FollowMyHealth Patient Portal offered by [facility] by registering at the following website: [email or web address]. By joining [facility]’s FollowMyHealth portal, you will also be able to view your health information using other applications (apps) compatible with our system.

## 2023-05-05 NOTE — ED PROVIDER NOTE - DISCHARGE DATE
Notified Dr Delma Koch re pt's hgb of 6.5. Ordered to transfuse 1 unit and re check hemoglobin after 6 hrs. 05-May-2023

## 2023-05-05 NOTE — ED ADULT NURSE NOTE - NSIMPLEMENTINTERV_GEN_ALL_ED
Implemented All Universal Safety Interventions:  [place] to call system. Call bell, personal items and telephone within reach. Instruct patient to call for assistance. Room bathroom lighting operational. Non-slip footwear when patient is off stretcher. Physically safe environment: no spills, clutter or unnecessary equipment. Stretcher in lowest position, wheels locked, appropriate side rails in place.

## 2023-05-05 NOTE — ED ADULT NURSE NOTE - OBJECTIVE STATEMENT
Pt presents to the ED with c/o left lower leg pain which woke her from sleep. Pt states she had eyelid surgery yesterday and has been on bedrest. Pt states she felt an intermittent shooting pain on the side of her left leg. Pt also reports pain with palpation and sees a slight bump with discoloration.

## 2023-05-08 ENCOUNTER — NON-APPOINTMENT (OUTPATIENT)
Age: 53
End: 2023-05-08

## 2023-05-17 PROBLEM — D68.61 ANTIPHOSPHOLIPID SYNDROME: Chronic | Status: ACTIVE | Noted: 2023-05-05

## 2023-05-17 PROBLEM — Z86.2 PERSONAL HISTORY OF DISEASES OF THE BLOOD AND BLOOD-FORMING ORGANS AND CERTAIN DISORDERS INVOLVING THE IMMUNE MECHANISM: Chronic | Status: ACTIVE | Noted: 2023-05-05

## 2023-05-17 PROBLEM — E03.9 HYPOTHYROIDISM, UNSPECIFIED: Chronic | Status: ACTIVE | Noted: 2023-05-05

## 2023-05-24 ENCOUNTER — APPOINTMENT (OUTPATIENT)
Dept: ORTHOPEDIC SURGERY | Facility: CLINIC | Age: 53
End: 2023-05-24
Payer: COMMERCIAL

## 2023-05-24 DIAGNOSIS — M47.816 SPONDYLOSIS W/OUT MYELOPATHY OR RADICULOPATHY, LUMBAR REGION: ICD-10-CM

## 2023-05-24 PROCEDURE — 99214 OFFICE O/P EST MOD 30 MIN: CPT

## 2023-05-24 PROCEDURE — 72170 X-RAY EXAM OF PELVIS: CPT

## 2023-05-24 PROCEDURE — 72110 X-RAY EXAM L-2 SPINE 4/>VWS: CPT

## 2023-05-24 NOTE — DISCUSSION/SUMMARY
[de-identified] : reviewed the case \par discussion of the tx options \par good candidate for LESI on the left at L5-S1. Already had a MBB without success\par would update the MRI L -spine to evaluate for neural compression \par fu with MRI for review\par \par Patient seen by "Jocelyn MCKEON" under the supervision of "Dr. Ortiz Justin"\par

## 2023-05-24 NOTE — HISTORY OF PRESENT ILLNESS
[Lower back] : lower back [Gradual] : gradual [Sudden] : sudden [5] : 5 [4] : 4 [Burning] : burning [Localized] : localized [Shooting] : shooting [Stabbing] : stabbing [Intermittent] : intermittent [Household chores] : household chores [Physical therapy] : physical therapy [Sitting] : sitting [Standing] : standing [de-identified] : 8/31/22:  51 yo F  pt presents here today  with  lower spine pain for years  - has "flare ups" - was having spasms on the left side in the spring after playing tennis - had 3x major flare ups over the summer \par \par Pain in the lower back left side - stabbing pain in the left lower back - got really bad few nights ago - was bad about a week before that \par \par Not really going down the legs \par \par tried ice pack \par \par pt has try physical therapy ,chiro care,acupucture,massage therapy in the past with some relief \par No recent injectoins\par No surgery \par pt saw Dr Henriquez back in  2018 got mri done an tpi injection which helped \par \par xrays today:\par L spine - slight curvature\par AP PELVIS - negatie \par \par IBS/hashimotos\par No hx of cancer \par \par 10/05/2022 here to review the mri of the l spine ,doing worse since last visit - plan at last was "severe back pain flare up \par PT\par mobic/flexeril \par indicated for MRi L spine - eval disc pathology \par fu to review the MRi" pain in the left side of the lower back - some degree of symptoms in the left leg \par \par she is set up US of the kidney cyst \par just started with PT \par been using the mobic and also the muscle relaxer \par \par MRI L spine\par 1. Convexity of the lumbar spine towards the left and multilevel degenerative disc disease with left paracentral/foraminal herniation impinging the left S1 nerve root with encroachment upon the left exiting L5 nerve root at L5-S1.\par 2. Asymmetric left foraminal narrowing at L3-L4 and L4-L5 and mild multilevel degenerative disc disease without acute fracture.\par 3. Findings suggesting bilateral parapelvic renal cysts incompletely evaluated on the current exam. Consider ultrasound of the kidneys to further evaluate as clinically indicated.\par \par 11/2/22: Here for follow up. Cont with PT and medications. Plan at last was "reviewed the MRi with her \par suspect the left side disc at L5-S1 is the issue for her \par rec she cont with PT \par would add MDP \par TPi tolerated well \par if not getting better would be a good candidate for LESI " - overall  the pain worse in the lower back \par \par \par 12/21/22 here for a follow up on the lower spine ,doing acupuncture which helps,pain is now on the center /left of the back ,tpi injection did not help - worse with standing for any length of time - tender around the middle back - most of the pain in the lower back and at times into the legs when the back gets bad \par \par has tried mobic and flexeril \par \par went to accupuncture \par \par has some cramping in the right foot \par \par 5/24/23: Here for fu on the lower spine; has activity related pain that can be debilitating. She uses a lumbar support. Running errands and hosting events can cause her agony. She is unable to play tennis.  There is stabbing pain in the low back. Occassional leg pain, but pain is mostly axial and to the left side. Did PT and acupuncture for several months.  Seen by Dr Kip Nicole (pain management) where she was given a MBB under guidance. Considering MARYA. \par \par XRAYS TODAY: \par L spine - loss of disc hieght L5-S1 \par AP PELVIS - negative  [] : Post Surgical Visit: no [FreeTextEntry1] : l spine  [FreeTextEntry5] : pt had re injured her lower back after playing tennis the other day as well as recent pain in her left shin down to her toe that pt believes is coming from her lower back  [de-identified] : Dr Jose De Jesus Cabello  [de-identified] : x rays and mri done at [facility]  back in 2018,mri done at [facility]  [de-identified] : tpi injection,acupuncture

## 2023-05-25 ENCOUNTER — FORM ENCOUNTER (OUTPATIENT)
Age: 53
End: 2023-05-25

## 2023-05-26 ENCOUNTER — APPOINTMENT (OUTPATIENT)
Dept: MRI IMAGING | Facility: CLINIC | Age: 53
End: 2023-05-26
Payer: COMMERCIAL

## 2023-05-26 PROCEDURE — 72148 MRI LUMBAR SPINE W/O DYE: CPT

## 2023-06-20 ENCOUNTER — APPOINTMENT (OUTPATIENT)
Dept: PAIN MANAGEMENT | Facility: CLINIC | Age: 53
End: 2023-06-20
Payer: COMMERCIAL

## 2023-06-20 VITALS — WEIGHT: 181 LBS | BODY MASS INDEX: 27.43 KG/M2 | HEIGHT: 68 IN

## 2023-06-20 PROCEDURE — 99204 OFFICE O/P NEW MOD 45 MIN: CPT

## 2023-06-20 NOTE — PHYSICAL EXAM
[4___] : left quadriceps 4[unfilled]/5 [5___] : right quadriceps 5[unfilled]/5 [] : no palpable masses [Flexion] : flexion

## 2023-06-20 NOTE — HISTORY OF PRESENT ILLNESS
[Lower back] : lower back [10] : 10 [7] : 7 [Dull/Aching] : dull/aching [Radiating] : radiating [Sharp] : sharp [Shooting] : shooting [Stabbing] : stabbing [Constant] : constant [Rest] : rest [Nothing helps with pain getting better] : Nothing helps with pain getting better [Sitting] : sitting [Standing] : standing [Walking] : walking [de-identified] : 06/20/2023 : Patient presents for initial evaluation. She c/o low back pain.  Pain started getting worse since June 2022.  Cant play tennis.  Pain is in low back L>R.  Has stabbing pains on lower left side.  Standing makes it worse.  Pain will go to outer part of left thigh.  Did PT, acupuncture with no relief.  Had numbness in foot but resolved.  Was on Mobic.  Gong to [place] July 11.  Had an MBB with Dr. Nicole with no relief.  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  On Nortriptyline for migraines.\par \par Subjective Weakness: No\par Numbness/Tingling: No\par Bladder/Bowel dysfunction: No\par Treatments Tried: PT, acu\par \par Attempted modalities for current pain complaint:\par See above:\par Medications: Yes- flexeril, mobic No relief with MDP.  \par \par Injections: Yes- B/L lumbar MBB \par \par Previous Spine Surgery: No\par \par Imaging:\par MRI Lumbar Spine (5/26/23) Multilevel lumbar degenerative disc disease most advanced at L5-S1 without progression from the most recent  prior MRI. Small disc herniations from L1-2 through L4-5 without stenosis, nerve root compression or change from the \par most recent prior MRI. Left-sided disc herniation at L5-S1 with mild compression of the left S1 nerve root unchanged from the most \par recent prior MRI study. Small right renal cyst.\par   [] : no [FreeTextEntry7] : left side [de-identified] : lifting leg, positioning

## 2023-06-20 NOTE — ASSESSMENT
[FreeTextEntry1] : After discussing various treatment options with the patient including but not limited to oral medications, physical therapy, exercise, modalities as well as interventional spinal injections, we have decided with the following plan:\par \par 1) Intervention Injection Therapy:\par I personally reviewed the MRI/CT scan images and agree with the radiologist's report. The radiological findings were discussed with the patient.\par The risks, benefits, contents and alternatives to injection were explained in full to the patient. Risks outlined include but are not limited to infection,sepsis, bleeding, post-dural puncture headache, nerve damage, temporary increase in pain, syncopal episode, failure to resolve symptoms, allergic reaction, symptom recurrence, and elevation of blood sugar in diabetics. Cortisone may cause immunosuppression. Patient understands the risks. All questions were answered. After discussion of options, patient requested an injection. Information regarding the injection was given to the patient. Which medications to stop prior to the injection was explained to the patient as well.\par \par Follow up in 1-2 weeks post injection for re-evaluation. \par Continue Home exercises, stretching, activity modification, physical therapy, and conservative care.\par \par Patient is presenting with acute/sub-acute radicular pain with impairment in ADLs and functionality.  The pain has not responded sufficiently to  conservative care including nsaid therapy and/or physical therapy.  There is no bleeding tendency, unstable medical condition, or systemic infection. The purpose of the spinal injections is to facilitate active therapy by providing short term relief through reduction of pain and inflammation. \par \par Injections, by themselves, are not likely to provide long-term relief. Rather, active rehabilitation with modified work achieves long-term relief by increasing active ROM, strength and stability. \par \par LESI L5/S1

## 2023-06-30 ENCOUNTER — APPOINTMENT (OUTPATIENT)
Age: 53
End: 2023-06-30
Payer: COMMERCIAL

## 2023-06-30 PROCEDURE — 62323 NJX INTERLAMINAR LMBR/SAC: CPT

## 2023-07-24 PROBLEM — M51.26 LUMBAR HERNIATED DISC: Status: ACTIVE | Noted: 2022-08-31

## 2023-07-25 ENCOUNTER — APPOINTMENT (OUTPATIENT)
Dept: PAIN MANAGEMENT | Facility: CLINIC | Age: 53
End: 2023-07-25
Payer: COMMERCIAL

## 2023-07-25 VITALS — HEIGHT: 68 IN | BODY MASS INDEX: 28.64 KG/M2 | WEIGHT: 189 LBS

## 2023-07-25 DIAGNOSIS — M51.26 OTHER INTERVERTEBRAL DISC DISPLACEMENT, LUMBAR REGION: ICD-10-CM

## 2023-07-25 PROCEDURE — 99213 OFFICE O/P EST LOW 20 MIN: CPT

## 2023-07-25 NOTE — PHYSICAL EXAM
[Flexion] : flexion [4___] : left quadriceps 4[unfilled]/5 [5___] : right quadriceps 5[unfilled]/5 [] : no palpable masses

## 2023-07-25 NOTE — HISTORY OF PRESENT ILLNESS
[Lower back] : lower back [Dull/Aching] : dull/aching [Rest] : rest [Nothing helps with pain getting better] : Nothing helps with pain getting better [Walking] : walking [8] : 8 [0] : 0 [Intermittent] : intermittent [Social interactions] : social interactions [Standing] : standing [FreeTextEntry1] : 95% relief  [] : no [FreeTextEntry7] : left side [de-identified] : 07/25/2023: follow up today for LESI L5-S1 on 6/30 and 95% relief.  Was able to enjoy walking on her trip.  \par \par \par 06/20/2023 : Patient presents for initial evaluation. She c/o low back pain.  Pain started getting worse since June 2022.  Cant play tennis.  Pain is in low back L>R.  Has stabbing pains on lower left side.  Standing makes it worse.  Pain will go to outer part of left thigh.  Did PT, acupuncture with no relief.  Had numbness in foot but resolved.  Was on Mobic.  Gong to [place] July 11.  Had an MBB with Dr. Nicole with no relief.  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  On Nortriptyline for migraines.\par \par Subjective Weakness: No\par Numbness/Tingling: No\par Bladder/Bowel dysfunction: No\par Treatments Tried: PT, acu\par \par Attempted modalities for current pain complaint:\par See above:\par Medications: Yes- flexeril, mobic No relief with MDP.  \par \par Injections: Yes- B/L lumbar MBB \par \par Previous Spine Surgery: No\par \par Imaging:\par MRI Lumbar Spine (5/26/23) Multilevel lumbar degenerative disc disease most advanced at L5-S1 without progression from the most recent  prior MRI. Small disc herniations from L1-2 through L4-5 without stenosis, nerve root compression or change from the \par most recent prior MRI. Left-sided disc herniation at L5-S1 with mild compression of the left S1 nerve root unchanged from the most \par recent prior MRI study. Small right renal cyst.\par

## 2023-08-04 ENCOUNTER — APPOINTMENT (OUTPATIENT)
Age: 53
End: 2023-08-04
Payer: COMMERCIAL

## 2023-08-04 PROCEDURE — 62323 NJX INTERLAMINAR LMBR/SAC: CPT

## 2023-08-15 ENCOUNTER — NON-APPOINTMENT (OUTPATIENT)
Age: 53
End: 2023-08-15

## 2023-08-30 ENCOUNTER — APPOINTMENT (OUTPATIENT)
Dept: PAIN MANAGEMENT | Facility: CLINIC | Age: 53
End: 2023-08-30
Payer: COMMERCIAL

## 2023-08-30 VITALS — BODY MASS INDEX: 26.98 KG/M2 | HEIGHT: 68 IN | WEIGHT: 178 LBS

## 2023-08-30 PROCEDURE — 99214 OFFICE O/P EST MOD 30 MIN: CPT

## 2023-08-30 NOTE — HISTORY OF PRESENT ILLNESS
[FreeTextEntry1] : 95% relief  [Lower back] : lower back [8] : 8 [0] : 0 [Dull/Aching] : dull/aching [Intermittent] : intermittent [Social interactions] : social interactions [Rest] : rest [Nothing helps with pain getting better] : Nothing helps with pain getting better [Standing] : standing [Walking] : walking [7] : 7 [Radiating] : radiating [Sharp] : sharp [Injection therapy] : injection therapy [] : no [FreeTextEntry6] : cramping  [FreeTextEntry7] : b/l ankles and feet  [de-identified] : 8/30/23 (15 minutes late) - fu for LESI L5-S1 on 8/4. had no relief following. Pain comes and goes. gets tightness in the lower back when she stands for longer periods of time.  Has tightness in the left ankle. Now getting pain in the right lateral ankle. She finds that her toes are curling.   07/25/2023: follow up today for LESI L5-S1 on 6/30 and 95% relief.  Was able to enjoy walking on her trip.    06/20/2023 : Patient presents for initial evaluation. She c/o low back pain.  Pain started getting worse since June 2022.  Cant play tennis.  Pain is in low back L>R.  Has stabbing pains on lower left side.  Standing makes it worse.  Pain will go to outer part of left thigh.  Did PT, acupuncture with no relief.  Had numbness in foot but resolved.  Was on Mobic.  Gong to [place] July 11.  Had an MBB with Dr. Nicole with no relief.  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  On Nortriptyline for migraines.  Subjective Weakness: No Numbness/Tingling: No Bladder/Bowel dysfunction: No Treatments Tried: PT, acu  Attempted modalities for current pain complaint: See above: Medications: Yes- flexeril, mobic No relief with MDP.    Injections: Yes- B/L lumbar MBB  LESI L5-S1 8/4/23 Previous Spine Surgery: No  Imaging: MRI Lumbar Spine (5/26/23) Multilevel lumbar degenerative disc disease most advanced at L5-S1 without progression from the most recent  prior MRI. Small disc herniations from L1-2 through L4-5 without stenosis, nerve root compression or change from the  most recent prior MRI. Left-sided disc herniation at L5-S1 with mild compression of the left S1 nerve root unchanged from the most  recent prior MRI study. Small right renal cyst.

## 2023-08-30 NOTE — ASSESSMENT
[FreeTextEntry1] : After discussing various treatment options with the patient including but not limited to oral medications, physical therapy, exercise, modalities as well as interventional spinal injections, we have decided with the following plan:  1) Intervention Injection Therapy: I personally reviewed the MRI/CT scan images and agree with the radiologist's report. The radiological findings were discussed with the patient. The risks, benefits, contents and alternatives to injection were explained in full to the patient. Risks outlined include but are not limited to infection,sepsis, bleeding, post-dural puncture headache, nerve damage, temporary increase in pain, syncopal episode, failure to resolve symptoms, allergic reaction, symptom recurrence, and elevation of blood sugar in diabetics. Cortisone may cause immunosuppression. Patient understands the risks. All questions were answered. After discussion of options, patient requested an injection. Information regarding the injection was given to the patient. Which medications to stop prior to the injection was explained to the patient as well.  Follow up in 1-2 weeks post injection for re-evaluation.  Continue Home exercises, stretching, activity modification, physical therapy, and conservative care.  Patient is presenting with acute/sub-acute radicular pain with impairment in ADLs and functionality.  The pain has not responded sufficiently to  conservative care including nsaid therapy and/or physical therapy.  There is no bleeding tendency, unstable medical condition, or systemic infection. The purpose of the spinal injections is to facilitate active therapy by providing short term relief through reduction of pain and inflammation.   Injections, by themselves, are not likely to provide long-term relief. Rather, active rehabilitation with modified work achieves long-term relief by increasing active ROM, strength and stability.   b/l L5/S1 TFESI  2) I would recommend a trial of neuropathic medication as patient presents with signs of nerve irritation. (ie burning, paresthesias etc) Goals of therapy would be to improve pain and overall QOL. Side effects reviewed with patient. Patient will call or stop medication if given side effects occur.

## 2023-10-27 ENCOUNTER — APPOINTMENT (OUTPATIENT)
Age: 53
End: 2023-10-27

## 2023-10-30 ENCOUNTER — NON-APPOINTMENT (OUTPATIENT)
Age: 53
End: 2023-10-30

## 2023-10-30 ENCOUNTER — APPOINTMENT (OUTPATIENT)
Dept: DERMATOLOGY | Facility: CLINIC | Age: 53
End: 2023-10-30
Payer: COMMERCIAL

## 2023-10-30 VITALS — WEIGHT: 187 LBS | HEIGHT: 69 IN | BODY MASS INDEX: 27.7 KG/M2

## 2023-10-30 DIAGNOSIS — E85.4 ORGAN-LIMITED AMYLOIDOSIS: ICD-10-CM

## 2023-10-30 DIAGNOSIS — L99 ORGAN-LIMITED AMYLOIDOSIS: ICD-10-CM

## 2023-10-30 DIAGNOSIS — L30.9 DERMATITIS, UNSPECIFIED: ICD-10-CM

## 2023-10-30 PROCEDURE — 99203 OFFICE O/P NEW LOW 30 MIN: CPT

## 2023-11-07 ENCOUNTER — APPOINTMENT (OUTPATIENT)
Dept: PAIN MANAGEMENT | Facility: CLINIC | Age: 53
End: 2023-11-07

## 2023-11-27 ENCOUNTER — MED ADMIN CHARGE (OUTPATIENT)
Age: 53
End: 2023-11-27

## 2023-11-27 ENCOUNTER — APPOINTMENT (OUTPATIENT)
Dept: INTERNAL MEDICINE | Facility: CLINIC | Age: 53
End: 2023-11-27
Payer: COMMERCIAL

## 2023-11-27 VITALS
DIASTOLIC BLOOD PRESSURE: 80 MMHG | RESPIRATION RATE: 16 BRPM | OXYGEN SATURATION: 98 % | TEMPERATURE: 97.1 F | SYSTOLIC BLOOD PRESSURE: 120 MMHG | BODY MASS INDEX: 27.7 KG/M2 | HEART RATE: 80 BPM | WEIGHT: 187 LBS | HEIGHT: 69 IN

## 2023-11-27 DIAGNOSIS — K21.9 GASTRO-ESOPHAGEAL REFLUX DISEASE W/OUT ESOPHAGITIS: ICD-10-CM

## 2023-11-27 DIAGNOSIS — E06.3 OTHER SPECIFIED HYPOTHYROIDISM: ICD-10-CM

## 2023-11-27 DIAGNOSIS — E78.5 HYPERLIPIDEMIA, UNSPECIFIED: ICD-10-CM

## 2023-11-27 DIAGNOSIS — H02.403 UNSPECIFIED PTOSIS OF BILATERAL EYELIDS: ICD-10-CM

## 2023-11-27 DIAGNOSIS — Z23 ENCOUNTER FOR IMMUNIZATION: ICD-10-CM

## 2023-11-27 DIAGNOSIS — E03.8 OTHER SPECIFIED HYPOTHYROIDISM: ICD-10-CM

## 2023-11-27 DIAGNOSIS — M94.0 CHONDROCOSTAL JUNCTION SYNDROME [TIETZE]: ICD-10-CM

## 2023-11-27 DIAGNOSIS — Z12.11 ENCOUNTER FOR SCREENING FOR MALIGNANT NEOPLASM OF COLON: ICD-10-CM

## 2023-11-27 PROCEDURE — 99214 OFFICE O/P EST MOD 30 MIN: CPT | Mod: 25

## 2023-11-27 PROCEDURE — G0008: CPT

## 2023-11-27 PROCEDURE — 90686 IIV4 VACC NO PRSV 0.5 ML IM: CPT

## 2023-11-29 PROBLEM — E78.5 HYPERLIPIDEMIA: Status: ACTIVE | Noted: 2022-10-14

## 2023-11-29 PROBLEM — K21.9 GERD (GASTROESOPHAGEAL REFLUX DISEASE): Status: ACTIVE | Noted: 2020-03-03

## 2023-11-29 PROBLEM — E03.8 HYPOTHYROIDISM DUE TO HASHIMOTO'S THYROIDITIS: Status: ACTIVE | Noted: 2019-05-21

## 2023-11-29 PROBLEM — H02.403 PTOSIS OF BOTH EYELIDS: Status: RESOLVED | Noted: 2023-04-20 | Resolved: 2023-11-29

## 2023-11-29 PROBLEM — Z23 ENCOUNTER FOR VACCINATION: Status: ACTIVE | Noted: 2022-10-14

## 2023-11-29 PROBLEM — M94.0 COSTOCHONDRITIS: Status: RESOLVED | Noted: 2020-03-03 | Resolved: 2023-11-29

## 2023-12-05 ENCOUNTER — APPOINTMENT (OUTPATIENT)
Dept: PAIN MANAGEMENT | Facility: CLINIC | Age: 53
End: 2023-12-05
Payer: COMMERCIAL

## 2023-12-05 VITALS — WEIGHT: 184 LBS | HEIGHT: 69 IN | BODY MASS INDEX: 27.25 KG/M2

## 2023-12-05 PROCEDURE — 99213 OFFICE O/P EST LOW 20 MIN: CPT

## 2023-12-05 RX ORDER — NORTRIPTYLINE HYDROCHLORIDE 25 MG/1
25 CAPSULE ORAL
Qty: 180 | Refills: 2 | Status: ACTIVE | COMMUNITY
Start: 2023-12-05 | End: 1900-01-01

## 2023-12-11 ENCOUNTER — APPOINTMENT (OUTPATIENT)
Dept: DERMATOLOGY | Facility: CLINIC | Age: 53
End: 2023-12-11
Payer: COMMERCIAL

## 2023-12-11 DIAGNOSIS — C44.91 BASAL CELL CARCINOMA OF SKIN, UNSPECIFIED: ICD-10-CM

## 2023-12-11 PROCEDURE — 12032 INTMD RPR S/A/T/EXT 2.6-7.5: CPT

## 2023-12-11 PROCEDURE — 11603 EXC TR-EXT MAL+MARG 2.1-3 CM: CPT

## 2023-12-22 ENCOUNTER — APPOINTMENT (OUTPATIENT)
Dept: DERMATOLOGY | Facility: CLINIC | Age: 53
End: 2023-12-22
Payer: COMMERCIAL

## 2023-12-22 PROCEDURE — 99024 POSTOP FOLLOW-UP VISIT: CPT

## 2024-01-02 LAB — CORE LAB BIOPSY: NORMAL

## 2024-01-06 ENCOUNTER — LABORATORY RESULT (OUTPATIENT)
Age: 54
End: 2024-01-06

## 2024-01-08 DIAGNOSIS — D72.819 DECREASED WHITE BLOOD CELL COUNT, UNSPECIFIED: ICD-10-CM

## 2024-01-09 ENCOUNTER — TRANSCRIPTION ENCOUNTER (OUTPATIENT)
Age: 54
End: 2024-01-09

## 2024-01-11 ENCOUNTER — TRANSCRIPTION ENCOUNTER (OUTPATIENT)
Age: 54
End: 2024-01-11

## 2024-01-16 ENCOUNTER — TRANSCRIPTION ENCOUNTER (OUTPATIENT)
Age: 54
End: 2024-01-16

## 2024-01-22 DIAGNOSIS — R11.0 NAUSEA: ICD-10-CM

## 2024-01-22 RX ORDER — ONDANSETRON 4 MG/1
4 TABLET ORAL
Qty: 15 | Refills: 1 | Status: ACTIVE | COMMUNITY
Start: 2024-01-22 | End: 1900-01-01

## 2024-01-24 ENCOUNTER — APPOINTMENT (OUTPATIENT)
Dept: PAIN MANAGEMENT | Facility: CLINIC | Age: 54
End: 2024-01-24
Payer: COMMERCIAL

## 2024-01-24 VITALS
HEART RATE: 69 BPM | HEIGHT: 67.5 IN | DIASTOLIC BLOOD PRESSURE: 87 MMHG | BODY MASS INDEX: 28.85 KG/M2 | SYSTOLIC BLOOD PRESSURE: 122 MMHG | WEIGHT: 186 LBS

## 2024-01-24 DIAGNOSIS — M79.10 MYALGIA, UNSPECIFIED SITE: ICD-10-CM

## 2024-01-24 DIAGNOSIS — M54.16 RADICULOPATHY, LUMBAR REGION: ICD-10-CM

## 2024-01-24 DIAGNOSIS — G43.901 MIGRAINE, UNSPECIFIED, NOT INTRACTABLE, WITH STATUS MIGRAINOSUS: ICD-10-CM

## 2024-01-24 PROCEDURE — 99213 OFFICE O/P EST LOW 20 MIN: CPT

## 2024-02-05 ENCOUNTER — TRANSCRIPTION ENCOUNTER (OUTPATIENT)
Age: 54
End: 2024-02-05

## 2024-02-05 ENCOUNTER — APPOINTMENT (OUTPATIENT)
Dept: DERMATOLOGY | Facility: CLINIC | Age: 54
End: 2024-02-05
Payer: COMMERCIAL

## 2024-02-05 VITALS — WEIGHT: 177 LBS | BODY MASS INDEX: 27.46 KG/M2 | HEIGHT: 67.5 IN

## 2024-02-05 DIAGNOSIS — D18.01 HEMANGIOMA OF SKIN AND SUBCUTANEOUS TISSUE: ICD-10-CM

## 2024-02-05 DIAGNOSIS — Z85.828 PERSONAL HISTORY OF OTHER MALIGNANT NEOPLASM OF SKIN: ICD-10-CM

## 2024-02-05 DIAGNOSIS — L82.1 OTHER SEBORRHEIC KERATOSIS: ICD-10-CM

## 2024-02-05 DIAGNOSIS — L82.0 INFLAMED SEBORRHEIC KERATOSIS: ICD-10-CM

## 2024-02-05 DIAGNOSIS — D22.9 MELANOCYTIC NEVI, UNSPECIFIED: ICD-10-CM

## 2024-02-05 DIAGNOSIS — L81.4 OTHER MELANIN HYPERPIGMENTATION: ICD-10-CM

## 2024-02-05 PROCEDURE — 99214 OFFICE O/P EST MOD 30 MIN: CPT | Mod: 25

## 2024-02-05 PROCEDURE — 17110 DESTRUCTION B9 LES UP TO 14: CPT

## 2024-02-05 RX ORDER — GABAPENTIN 300 MG/1
300 CAPSULE ORAL
Qty: 270 | Refills: 0 | Status: DISCONTINUED | COMMUNITY
Start: 2023-12-05 | End: 2024-02-05

## 2024-02-05 RX ORDER — CYCLOBENZAPRINE HYDROCHLORIDE 10 MG/1
10 TABLET, FILM COATED ORAL
Qty: 180 | Refills: 1 | Status: DISCONTINUED | COMMUNITY
Start: 2022-08-31 | End: 2024-02-05

## 2024-02-05 RX ORDER — GABAPENTIN 300 MG/1
300 CAPSULE ORAL 3 TIMES DAILY
Qty: 90 | Refills: 2 | Status: DISCONTINUED | COMMUNITY
Start: 2023-08-30 | End: 2024-02-05

## 2024-02-05 RX ORDER — NORTRIPTYLINE HYDROCHLORIDE 50 MG/1
50 CAPSULE ORAL
Qty: 30 | Refills: 0 | Status: DISCONTINUED | COMMUNITY
Start: 2023-12-08 | End: 2024-02-05

## 2024-02-05 NOTE — HISTORY OF PRESENT ILLNESS
[FreeTextEntry1] : FBSE [de-identified] : Patient presents for skin check; No itching, bleeding, growing, changing lesions noted. Patient has hx of BCC.

## 2024-02-05 NOTE — ASSESSMENT
[FreeTextEntry1] : A complete skin examination was performed.  There is no evidence of skin cancer.  We discussed the importance of photoprotection, including the use of hats, protective clothing and sunscreens with an SPF of at least 30.  Sun avoidance was also discussed.  The ABCDE's of melanoma were discussed.  Regular skin exams recommended.  Inflamed SK: Benign destruction/Snip removal  Hx of BCC: Scar well-healed; no signs of recurrence

## 2024-02-05 NOTE — PHYSICAL EXAM
[Alert] : alert [Oriented x 3] : ~L oriented x 3 [Well Nourished] : well nourished [Conjunctiva Non-injected] : conjunctiva non-injected [No Visual Lymphadenopathy] : no visual  lymphadenopathy [No Clubbing] : no clubbing [No Edema] : no edema [No Bromhidrosis] : no bromhidrosis [No Chromhidrosis] : no chromhidrosis [FreeTextEntry3] : A full skin exam was performed including the scalp, face (including the lips, ears, nose and eyes), neck, chest, breasts, abdomen, back, buttocks, upper extremities and lower extremities.  The patient declined examination of the genitalia.   The exam revealed the following benign growths:  Pigmented nevi.  Seborrheic keratoses.  Lentigines.  Cherry angiomas.  Inflamed, waxy, keratotic papule of abdomen  Well-healed scar from BCC of left chest

## 2024-02-08 ENCOUNTER — TRANSCRIPTION ENCOUNTER (OUTPATIENT)
Age: 54
End: 2024-02-08

## 2024-02-09 ENCOUNTER — TRANSCRIPTION ENCOUNTER (OUTPATIENT)
Age: 54
End: 2024-02-09

## 2024-02-10 PROBLEM — M54.16 LUMBAR RADICULOPATHY: Status: ACTIVE | Noted: 2022-08-31

## 2024-02-10 PROBLEM — M79.10 MYALGIA: Status: ACTIVE | Noted: 2020-03-13

## 2024-02-10 NOTE — PHYSICAL EXAM
[General Appearance - Alert] : alert [General Appearance - In No Acute Distress] : in no acute distress [General Appearance - Well Developed] : well developed [General Appearance - Well Nourished] : well nourished [Impaired Insight] : insight and judgment were intact [Affect] : the affect was normal [Mood] : the mood was normal [Person] : oriented to person [Place] : oriented to place [Time] : oriented to time [Registration Intact] : recent registration memory intact [Remote Intact] : remote memory intact [Span Intact] : the attention span was normal [Naming Objects] : no difficulty naming common objects [Visual Intact] : visual attention was ~T not ~L decreased [Comprehension] : comprehension intact [Fluency] : fluency intact [Current Events] : adequate knowledge of current events [Past History] : adequate knowledge of personal past history [Vocabulary] : adequate range of vocabulary [Cranial Nerves Oculomotor (III)] : extraocular motion intact [Cranial Nerves Optic (II)] : visual acuity intact bilaterally,  visual fields full to confrontation, pupils equal round and reactive to light [Cranial Nerves Vestibulocochlear (VIII)] : hearing was intact bilaterally [Cranial Nerves Facial (VII)] : face symmetrical [Cranial Nerves Hypoglossal (XII)] : there was no tongue deviation with protrusion [No Muscle Atrophy] : normal bulk in all four extremities [Motor Handedness Right-Handed] : the patient is right hand dominant [Motor Strength Upper Extremities Bilaterally] : strength was normal in both upper extremities [Abnormal Walk] : normal gait [Tremor] : no tremor present [Dysdiadochokinesia Bilaterally] : not present [Outer Ear] : the ears and nose were normal in appearance [Hearing Threshold Finger Rub Not [place]] : hearing was normal [Neck Cervical Mass (___cm)] : no neck mass was observed [Involuntary Movements] : no involuntary movements were seen [Skin Color & Pigmentation] : normal skin color and pigmentation [] : no rash

## 2024-02-10 NOTE — HISTORY OF PRESENT ILLNESS
[FreeTextEntry1] : Pt notes her headaches have altogether doing well.  However, has had progressive lbp problems.  Moderate control with PT but also had increased nortriptyline to 50mg under oversight of pain mgmt. Did have stress with son's health. Is to start weight loss medicaiton due to elevated weight and cholesterol. still on qday asa. No change in quality of headaches  [Nausea] : nausea [Headache] : headache [Photophobia] : photophobia [Phonophobia] : phonophobia [Scalp Tenderness] : scalp tenderness [> 4 hours] : > 4 hours [Stable] : The patient reports ~his/her~ symptoms since the last visit are stable

## 2024-02-10 NOTE — ASSESSMENT
[FreeTextEntry1] : continue nortirltypine at 50mg and check bloood level trial of prn hydroxyzine for pain and headache. rtc 4 months.

## 2024-02-10 NOTE — REVIEW OF SYSTEMS
[Feeling Poorly] : feeling poorly [Fever] : no fever [Feeling Tired] : not feeling tired [Eyesight Problems] : no eyesight problems [Nasal Discharge] : no nasal discharge [Chest Pain] : no chest pain [Cough] : no cough [Palpitations] : no palpitations [Constipation] : no constipation [As Noted in HPI] : as noted in HPI [Lower Back Pain] : lower back pain [Arthralgias] : arthralgias [Skin Lesions] : no skin lesions [Skin Wound] : no skin wound [Itching] : no itching [Convulsions] : no convulsions [Fainting] : no fainting [Sleep Disturbances] : sleep disturbances [Snoring] : no snoring [Muscle Weakness] : no muscle weakness [Swollen Glands] : no swollen glands

## 2024-03-11 ENCOUNTER — TRANSCRIPTION ENCOUNTER (OUTPATIENT)
Age: 54
End: 2024-03-11

## 2024-03-15 ENCOUNTER — TRANSCRIPTION ENCOUNTER (OUTPATIENT)
Age: 54
End: 2024-03-15

## 2024-04-06 ENCOUNTER — LABORATORY RESULT (OUTPATIENT)
Age: 54
End: 2024-04-06

## 2024-04-09 LAB — NORTRIP SERPL-SCNC: 24 NG/ML

## 2024-04-18 ENCOUNTER — APPOINTMENT (OUTPATIENT)
Dept: CT IMAGING | Facility: CLINIC | Age: 54
End: 2024-04-18
Payer: COMMERCIAL

## 2024-04-18 PROCEDURE — 75571 CT HRT W/O DYE W/CA TEST: CPT

## 2024-05-06 ENCOUNTER — APPOINTMENT (OUTPATIENT)
Dept: INTERNAL MEDICINE | Facility: CLINIC | Age: 54
End: 2024-05-06
Payer: COMMERCIAL

## 2024-05-06 VITALS
SYSTOLIC BLOOD PRESSURE: 110 MMHG | HEIGHT: 67.5 IN | DIASTOLIC BLOOD PRESSURE: 74 MMHG | WEIGHT: 164.38 LBS | OXYGEN SATURATION: 99 % | HEART RATE: 72 BPM | BODY MASS INDEX: 25.5 KG/M2 | RESPIRATION RATE: 16 BRPM | TEMPERATURE: 97.9 F

## 2024-05-06 PROCEDURE — G2211 COMPLEX E/M VISIT ADD ON: CPT

## 2024-05-06 PROCEDURE — 99214 OFFICE O/P EST MOD 30 MIN: CPT

## 2024-05-06 RX ORDER — HYDROXYZINE HYDROCHLORIDE 25 MG/1
25 TABLET ORAL
Qty: 45 | Refills: 3 | Status: DISCONTINUED | COMMUNITY
Start: 2024-01-24 | End: 2024-05-06

## 2024-05-06 RX ORDER — MELOXICAM 15 MG/1
15 TABLET ORAL DAILY
Qty: 90 | Refills: 1 | Status: DISCONTINUED | COMMUNITY
Start: 2022-08-31 | End: 2024-05-06

## 2024-05-06 RX ORDER — FENUGREEK SEED/BL.THISTLE/ANIS 340 MG
CAPSULE ORAL
Refills: 0 | Status: DISCONTINUED | COMMUNITY
End: 2024-05-06

## 2024-05-06 RX ORDER — FAMOTIDINE 20 MG/1
20 TABLET, FILM COATED ORAL
Qty: 180 | Refills: 0 | Status: DISCONTINUED | COMMUNITY
Start: 2020-03-04 | End: 2024-05-06

## 2024-05-06 RX ORDER — TIRZEPATIDE 2.5 MG/.5ML
2.5 INJECTION, SOLUTION SUBCUTANEOUS
Qty: 2 | Refills: 0 | Status: ACTIVE | COMMUNITY
Start: 2024-01-16 | End: 1900-01-01

## 2024-05-07 RX ORDER — LORATADINE 5 MG
TABLET,CHEWABLE ORAL
Refills: 0 | Status: ACTIVE | COMMUNITY

## 2024-05-07 RX ORDER — PANTOPRAZOLE 20 MG/1
20 TABLET, DELAYED RELEASE ORAL DAILY
Refills: 0 | Status: ACTIVE | COMMUNITY

## 2024-05-07 NOTE — HISTORY OF PRESENT ILLNESS
[FreeTextEntry1] : Medication renewal, and discussion on weight loss  [de-identified] : Mrs. KENNEDY is a 53 year- female, with a past medical history as noted below, who present to the office today for medication renewal for weight loss.  Patient states she continues to lose weight about a pound to 2 pounds a week on Zepbound 5 milligrams weekly.  Denies having any significant adverse side effects.  Also She suffers from acid reflux and she takes Protonix on a daily basis which seems to help.  Overall feels more energy has less joint pain. Worried about not being able to get the medication secondary to the shortage. Had recent blood work which showed improvement in hide cholesterol

## 2024-05-07 NOTE — ASSESSMENT
[FreeTextEntry1] : Ms. KENNEDY is a 53 year female, with a past medical history as noted above, who present to the office today for general checkup, and medication renewal.

## 2024-05-07 NOTE — PHYSICAL EXAM
[No Acute Distress] : no acute distress [Well Nourished] : well nourished [Well Developed] : well developed [Well-Appearing] : well-appearing [Normal Sclera/Conjunctiva] : normal sclera/conjunctiva [PERRL] : pupils equal round and reactive to light [EOMI] : extraocular movements intact [Normal Outer Ear/Nose] : the outer ears and nose were normal in appearance [Normal Oropharynx] : the oropharynx was normal [Normal TMs] : both tympanic membranes were normal [No JVD] : no jugular venous distention [No Lymphadenopathy] : no lymphadenopathy [Supple] : supple [No Respiratory Distress] : no respiratory distress  [No Accessory Muscle Use] : no accessory muscle use [Clear to Auscultation] : lungs were clear to auscultation bilaterally [Normal Rate] : normal rate  [Regular Rhythm] : with a regular rhythm [Normal S1, S2] : normal S1 and S2 [No Carotid Bruits] : no carotid bruits [No Abdominal Bruit] : a ~M bruit was not heard ~T in the abdomen [No Varicosities] : no varicosities [Pedal Pulses Present] : the pedal pulses are present [No Edema] : there was no peripheral edema [No Palpable Aorta] : no palpable aorta [No Extremity Clubbing/Cyanosis] : no extremity clubbing/cyanosis [Soft] : abdomen soft [Non-distended] : non-distended [No Masses] : no abdominal mass palpated [No HSM] : no HSM [Normal Bowel Sounds] : normal bowel sounds [Normal Posterior Cervical Nodes] : no posterior cervical lymphadenopathy [Normal Anterior Cervical Nodes] : no anterior cervical lymphadenopathy [No CVA Tenderness] : no CVA  tenderness [No Spinal Tenderness] : no spinal tenderness [Grossly Normal Strength/Tone] : grossly normal strength/tone [No Joint Swelling] : no joint swelling [No Rash] : no rash [Coordination Grossly Intact] : coordination grossly intact [No Focal Deficits] : no focal deficits [Normal Gait] : normal gait [Deep Tendon Reflexes (DTR)] : deep tendon reflexes were 2+ and symmetric [Speech Grossly Normal] : speech grossly normal [Normal Affect] : the affect was normal [Alert and Oriented x3] : oriented to person, place, and time [Normal Mood] : the mood was normal [Normal Insight/Judgement] : insight and judgment were intact [de-identified] :  with murmur [de-identified] : As per gynecology [de-identified] : Right lower quadrant  slight tenderness no rebound or guarding noted.  patient with normal  bowel signs time for [de-identified] : As per gynecology

## 2024-05-07 NOTE — HEALTH RISK ASSESSMENT

## 2024-05-07 NOTE — REVIEW OF SYSTEMS
[Hot Flashes] : hot flashes [Recent Change In Weight] : ~T recent weight change [Nasal Discharge] : nasal discharge [Cough] : cough [Negative] : Heme/Lymph [FreeTextEntry4] : Sinus congestion, sinus pressure, ear popping [Fever] : no fever [Chills] : no chills [Fatigue] : no fatigue [Earache] : no earache [Hearing Loss] : no hearing loss [Palpitations] : no palpitations [Lower Ext Edema] : no lower extremity edema [Orthopnea] : no orthopnea [Shortness Of Breath] : no shortness of breath [Wheezing] : no wheezing [Constipation] : no constipation [Diarrhea] : diarrhea [Muscle Pain] : no muscle pain [Itching] : no itching [Skin Rash] : no skin rash [Headache] : no headache [Dizziness] : no dizziness [Fainting] : no fainting [Memory Loss] : no memory loss [Suicidal] : not suicidal [Insomnia] : no insomnia [Anxiety] : no anxiety [Depression] : no depression [Easy Bleeding] : no easy bleeding [Easy Bruising] : no easy bruising [Swollen Glands] : no swollen glands [FreeTextEntry2] : weight loss over 20lbs  [FreeTextEntry8] : overactive bladder

## 2024-05-07 NOTE — PLAN
[FreeTextEntry1] : Endocrinology hypothyroidism - continue Levothyroxine Sodium 112mcg p.o.q.o.d. as directed -  Continue to monitor TSH.  Continue levothyroxine 112 mcg daily.  Gynecology hot flashes - likely secondary to menopause advised to follow-up with gynecology for routine Pap smears.  Clinical breast exam.  Neurology migraine - continue Nortriptyline HCl 25mg p.o.q.h.s. as directed - continue to follow up with neurologist, Dr. Cosme   Adult BMI screening and followup:  The patient's BMI is about normal. Counseled patient regarding BMI, healthy eating, portion control and exercise importance of diet to maintain a healthy weight.  Counseled patient on importance of exercise to maintain a healthy weight  Decrease Zepbound to 2.5 mg weekly. Continue to monitor weight if noticed weight go back up we will increase back to 5 mg.  I spent 30  Minutes with the patient, half of which we discussed finding on physical exam and coordinated care.  As well as reviewed my plans and follow ups. Dragon speech recognition software was used to create portions of this document.  An attempt at proofreading has been made to minimize errors please call if any questions arise.      All patient's questions and concerns were addressed

## 2024-07-30 ENCOUNTER — TRANSCRIPTION ENCOUNTER (OUTPATIENT)
Age: 54
End: 2024-07-30

## 2024-07-30 RX ORDER — TIRZEPATIDE 5 MG/.5ML
5 INJECTION, SOLUTION SUBCUTANEOUS
Qty: 12 | Refills: 0 | Status: ACTIVE | COMMUNITY
Start: 2024-07-30 | End: 1900-01-01

## 2024-07-31 PROBLEM — E06.3 HYPOTHYROIDISM DUE TO HASHIMOTO'S THYROIDITIS: Status: ACTIVE | Noted: 2019-05-21

## 2024-10-12 ENCOUNTER — APPOINTMENT (OUTPATIENT)
Dept: CT IMAGING | Facility: CLINIC | Age: 54
End: 2024-10-12

## 2024-10-12 ENCOUNTER — OUTPATIENT (OUTPATIENT)
Dept: OUTPATIENT SERVICES | Facility: HOSPITAL | Age: 54
LOS: 1 days | End: 2024-10-12
Payer: COMMERCIAL

## 2024-10-12 DIAGNOSIS — Z00.8 ENCOUNTER FOR OTHER GENERAL EXAMINATION: ICD-10-CM

## 2024-10-12 DIAGNOSIS — I71.20 THORACIC AORTIC ANEURYSM, WITHOUT RUPTURE, UNSPECIFIED: ICD-10-CM

## 2024-10-12 PROCEDURE — 71250 CT THORAX DX C-: CPT | Mod: 26

## 2024-10-12 PROCEDURE — 71250 CT THORAX DX C-: CPT

## 2024-10-14 ENCOUNTER — NON-APPOINTMENT (OUTPATIENT)
Age: 54
End: 2024-10-14

## 2024-10-14 ENCOUNTER — MED ADMIN CHARGE (OUTPATIENT)
Age: 54
End: 2024-10-14

## 2024-10-14 ENCOUNTER — APPOINTMENT (OUTPATIENT)
Dept: INTERNAL MEDICINE | Facility: CLINIC | Age: 54
End: 2024-10-14

## 2024-10-14 VITALS
OXYGEN SATURATION: 96 % | SYSTOLIC BLOOD PRESSURE: 110 MMHG | WEIGHT: 144 LBS | TEMPERATURE: 97.5 F | BODY MASS INDEX: 22.34 KG/M2 | RESPIRATION RATE: 16 BRPM | HEIGHT: 67.5 IN | HEART RATE: 62 BPM | DIASTOLIC BLOOD PRESSURE: 70 MMHG

## 2024-10-14 DIAGNOSIS — R00.2 PALPITATIONS: ICD-10-CM

## 2024-10-14 DIAGNOSIS — K21.9 GASTRO-ESOPHAGEAL REFLUX DISEASE W/OUT ESOPHAGITIS: ICD-10-CM

## 2024-10-14 DIAGNOSIS — Z23 ENCOUNTER FOR IMMUNIZATION: ICD-10-CM

## 2024-10-14 DIAGNOSIS — E78.5 HYPERLIPIDEMIA, UNSPECIFIED: ICD-10-CM

## 2024-10-14 DIAGNOSIS — E06.3 AUTOIMMUNE THYROIDITIS: ICD-10-CM

## 2024-10-14 PROCEDURE — 90656 IIV3 VACC NO PRSV 0.5 ML IM: CPT

## 2024-10-14 PROCEDURE — 99214 OFFICE O/P EST MOD 30 MIN: CPT | Mod: 25

## 2024-10-14 PROCEDURE — 93000 ELECTROCARDIOGRAM COMPLETE: CPT

## 2024-10-14 PROCEDURE — G0008: CPT

## 2024-10-14 PROCEDURE — 36415 COLL VENOUS BLD VENIPUNCTURE: CPT

## 2024-10-14 RX ORDER — LANSOPRAZOLE 15 MG/1
15 CAPSULE, DELAYED RELEASE ORAL DAILY
Qty: 90 | Refills: 0 | Status: ACTIVE | COMMUNITY
Start: 2024-10-14 | End: 1900-01-01

## 2024-10-15 LAB
ALBUMIN SERPL ELPH-MCNC: 4.5 G/DL
ALP BLD-CCNC: 58 U/L
ALT SERPL-CCNC: 9 U/L
ANION GAP SERPL CALC-SCNC: 15 MMOL/L
AST SERPL-CCNC: 16 U/L
BASOPHILS # BLD AUTO: 0.02 K/UL
BASOPHILS NFR BLD AUTO: 0.5 %
BILIRUB SERPL-MCNC: 0.4 MG/DL
BUN SERPL-MCNC: 17 MG/DL
CALCIUM SERPL-MCNC: 9.1 MG/DL
CHLORIDE SERPL-SCNC: 106 MMOL/L
CHOLEST SERPL-MCNC: 181 MG/DL
CO2 SERPL-SCNC: 21 MMOL/L
CREAT SERPL-MCNC: 0.87 MG/DL
EGFR: 80 ML/MIN/1.73M2
EOSINOPHIL # BLD AUTO: 0.04 K/UL
EOSINOPHIL NFR BLD AUTO: 1 %
GLUCOSE SERPL-MCNC: 96 MG/DL
HCT VFR BLD CALC: 42.7 %
HDLC SERPL-MCNC: 55 MG/DL
HGB BLD-MCNC: 13.9 G/DL
IMM GRANULOCYTES NFR BLD AUTO: 0.2 %
LDLC SERPL CALC-MCNC: 113 MG/DL
LYMPHOCYTES # BLD AUTO: 1.26 K/UL
LYMPHOCYTES NFR BLD AUTO: 30.6 %
MAN DIFF?: NORMAL
MCHC RBC-ENTMCNC: 32.2 PG
MCHC RBC-ENTMCNC: 32.6 GM/DL
MCV RBC AUTO: 98.8 FL
MONOCYTES # BLD AUTO: 0.33 K/UL
MONOCYTES NFR BLD AUTO: 8 %
NEUTROPHILS # BLD AUTO: 2.46 K/UL
NEUTROPHILS NFR BLD AUTO: 59.7 %
NONHDLC SERPL-MCNC: 126 MG/DL
PLATELET # BLD AUTO: 261 K/UL
POTASSIUM SERPL-SCNC: 4.5 MMOL/L
PROT SERPL-MCNC: 6.9 G/DL
RBC # BLD: 4.32 M/UL
RBC # FLD: 13.3 %
SODIUM SERPL-SCNC: 142 MMOL/L
T3FREE SERPL-MCNC: 1.96 PG/ML
T4 FREE SERPL-MCNC: 1.5 NG/DL
TRIGL SERPL-MCNC: 72 MG/DL
TSH SERPL-ACNC: 4.42 UIU/ML
WBC # FLD AUTO: 4.12 K/UL

## 2024-10-28 ENCOUNTER — TRANSCRIPTION ENCOUNTER (OUTPATIENT)
Age: 54
End: 2024-10-28

## 2024-10-28 DIAGNOSIS — N28.9 DISORDER OF KIDNEY AND URETER, UNSPECIFIED: ICD-10-CM

## 2024-11-05 ENCOUNTER — TRANSCRIPTION ENCOUNTER (OUTPATIENT)
Age: 54
End: 2024-11-05

## 2024-11-26 ENCOUNTER — OUTPATIENT (OUTPATIENT)
Dept: OUTPATIENT SERVICES | Facility: HOSPITAL | Age: 54
LOS: 1 days | End: 2024-11-26
Payer: COMMERCIAL

## 2024-11-26 ENCOUNTER — APPOINTMENT (OUTPATIENT)
Dept: ULTRASOUND IMAGING | Facility: CLINIC | Age: 54
End: 2024-11-26
Payer: COMMERCIAL

## 2024-11-26 DIAGNOSIS — Z00.8 ENCOUNTER FOR OTHER GENERAL EXAMINATION: ICD-10-CM

## 2024-11-26 DIAGNOSIS — N28.9 DISORDER OF KIDNEY AND URETER, UNSPECIFIED: ICD-10-CM

## 2024-11-26 PROCEDURE — 76770 US EXAM ABDO BACK WALL COMP: CPT | Mod: 26

## 2024-11-26 PROCEDURE — 76770 US EXAM ABDO BACK WALL COMP: CPT

## 2024-12-02 DIAGNOSIS — N28.9 DISORDER OF KIDNEY AND URETER, UNSPECIFIED: ICD-10-CM

## 2024-12-09 ENCOUNTER — APPOINTMENT (OUTPATIENT)
Dept: MRI IMAGING | Facility: CLINIC | Age: 54
End: 2024-12-09
Payer: COMMERCIAL

## 2024-12-09 ENCOUNTER — OUTPATIENT (OUTPATIENT)
Dept: OUTPATIENT SERVICES | Facility: HOSPITAL | Age: 54
LOS: 1 days | End: 2024-12-09
Payer: COMMERCIAL

## 2024-12-09 DIAGNOSIS — Z00.8 ENCOUNTER FOR OTHER GENERAL EXAMINATION: ICD-10-CM

## 2024-12-09 PROCEDURE — 74183 MRI ABD W/O CNTR FLWD CNTR: CPT

## 2024-12-09 PROCEDURE — A9585: CPT

## 2024-12-09 PROCEDURE — 74183 MRI ABD W/O CNTR FLWD CNTR: CPT | Mod: 26

## 2025-01-06 ENCOUNTER — TRANSCRIPTION ENCOUNTER (OUTPATIENT)
Age: 55
End: 2025-01-06

## 2025-01-08 ENCOUNTER — APPOINTMENT (OUTPATIENT)
Dept: ELECTROPHYSIOLOGY | Facility: CLINIC | Age: 55
End: 2025-01-08
Payer: COMMERCIAL

## 2025-01-08 VITALS
BODY MASS INDEX: 21.05 KG/M2 | WEIGHT: 142.1 LBS | DIASTOLIC BLOOD PRESSURE: 70 MMHG | SYSTOLIC BLOOD PRESSURE: 110 MMHG | HEART RATE: 59 BPM | HEIGHT: 69 IN

## 2025-01-08 PROCEDURE — 93000 ELECTROCARDIOGRAM COMPLETE: CPT

## 2025-01-08 PROCEDURE — 99245 OFF/OP CONSLTJ NEW/EST HI 55: CPT

## 2025-01-13 ENCOUNTER — TRANSCRIPTION ENCOUNTER (OUTPATIENT)
Age: 55
End: 2025-01-13

## 2025-01-13 RX ORDER — METOPROLOL SUCCINATE 25 MG/1
25 TABLET, EXTENDED RELEASE ORAL
Qty: 90 | Refills: 3 | Status: ACTIVE | COMMUNITY
Start: 2025-01-13 | End: 1900-01-01

## 2025-01-14 ENCOUNTER — APPOINTMENT (OUTPATIENT)
Dept: UROLOGY | Facility: CLINIC | Age: 55
End: 2025-01-14
Payer: COMMERCIAL

## 2025-01-14 ENCOUNTER — APPOINTMENT (OUTPATIENT)
Dept: UROLOGY | Facility: CLINIC | Age: 55
End: 2025-01-14

## 2025-01-14 VITALS
WEIGHT: 142 LBS | SYSTOLIC BLOOD PRESSURE: 110 MMHG | BODY MASS INDEX: 21.03 KG/M2 | HEIGHT: 69 IN | DIASTOLIC BLOOD PRESSURE: 70 MMHG | HEART RATE: 70 BPM | OXYGEN SATURATION: 100 %

## 2025-01-14 PROCEDURE — 99204 OFFICE O/P NEW MOD 45 MIN: CPT

## 2025-01-21 ENCOUNTER — NON-APPOINTMENT (OUTPATIENT)
Age: 55
End: 2025-01-21

## 2025-01-21 ENCOUNTER — TRANSCRIPTION ENCOUNTER (OUTPATIENT)
Age: 55
End: 2025-01-21

## 2025-01-21 DIAGNOSIS — I48.0 PAROXYSMAL ATRIAL FIBRILLATION: ICD-10-CM

## 2025-01-22 ENCOUNTER — TRANSCRIPTION ENCOUNTER (OUTPATIENT)
Age: 55
End: 2025-01-22

## 2025-01-27 ENCOUNTER — TRANSCRIPTION ENCOUNTER (OUTPATIENT)
Age: 55
End: 2025-01-27

## 2025-02-10 ENCOUNTER — LABORATORY RESULT (OUTPATIENT)
Age: 55
End: 2025-02-10

## 2025-02-10 ENCOUNTER — APPOINTMENT (OUTPATIENT)
Dept: INTERNAL MEDICINE | Facility: CLINIC | Age: 55
End: 2025-02-10
Payer: COMMERCIAL

## 2025-02-10 VITALS
BODY MASS INDEX: 21.55 KG/M2 | TEMPERATURE: 97.6 F | HEART RATE: 64 BPM | HEIGHT: 69 IN | OXYGEN SATURATION: 97 % | SYSTOLIC BLOOD PRESSURE: 110 MMHG | WEIGHT: 145.5 LBS | RESPIRATION RATE: 16 BRPM | DIASTOLIC BLOOD PRESSURE: 70 MMHG

## 2025-02-10 DIAGNOSIS — E06.3 AUTOIMMUNE THYROIDITIS: ICD-10-CM

## 2025-02-10 DIAGNOSIS — R06.83 SNORING: ICD-10-CM

## 2025-02-10 DIAGNOSIS — R40.0 SOMNOLENCE: ICD-10-CM

## 2025-02-10 DIAGNOSIS — N28.9 DISORDER OF KIDNEY AND URETER, UNSPECIFIED: ICD-10-CM

## 2025-02-10 DIAGNOSIS — I48.0 PAROXYSMAL ATRIAL FIBRILLATION: ICD-10-CM

## 2025-02-10 DIAGNOSIS — Z20.822 CONTACT WITH AND (SUSPECTED) EXPOSURE TO COVID-19: ICD-10-CM

## 2025-02-10 DIAGNOSIS — Z13.21 ENCOUNTER FOR SCREENING FOR NUTRITIONAL DISORDER: ICD-10-CM

## 2025-02-10 DIAGNOSIS — Z23 ENCOUNTER FOR IMMUNIZATION: ICD-10-CM

## 2025-02-10 DIAGNOSIS — G47.9 SLEEP DISORDER, UNSPECIFIED: ICD-10-CM

## 2025-02-10 PROCEDURE — G2211 COMPLEX E/M VISIT ADD ON: CPT | Mod: NC

## 2025-02-10 PROCEDURE — 36415 COLL VENOUS BLD VENIPUNCTURE: CPT

## 2025-02-10 PROCEDURE — 99215 OFFICE O/P EST HI 40 MIN: CPT

## 2025-02-11 PROBLEM — Z13.21 ENCOUNTER FOR VITAMIN DEFICIENCY SCREENING: Status: RESOLVED | Noted: 2020-03-03 | Resolved: 2025-02-11

## 2025-02-11 PROBLEM — Z20.822 ENCOUNTER FOR LABORATORY TESTING FOR COVID-19 VIRUS: Status: RESOLVED | Noted: 2021-02-16 | Resolved: 2025-02-11

## 2025-02-11 PROBLEM — R06.83 SNORES: Status: ACTIVE | Noted: 2025-02-10

## 2025-02-11 PROBLEM — Z23 ENCOUNTER FOR VACCINATION: Status: RESOLVED | Noted: 2022-10-14 | Resolved: 2025-02-11

## 2025-02-11 RX ORDER — APIXABAN 5 MG/1
5 TABLET, FILM COATED ORAL
Refills: 0 | Status: ACTIVE | COMMUNITY

## 2025-02-12 LAB
ALBUMIN SERPL ELPH-MCNC: 4.2 G/DL
ALP BLD-CCNC: 56 U/L
ALT SERPL-CCNC: 7 U/L
ANION GAP SERPL CALC-SCNC: 13 MMOL/L
AST SERPL-CCNC: 16 U/L
BASOPHILS # BLD AUTO: 0.01 K/UL
BASOPHILS NFR BLD AUTO: 0.2 %
BILIRUB SERPL-MCNC: 0.3 MG/DL
BUN SERPL-MCNC: 31 MG/DL
CALCIUM SERPL-MCNC: 9.7 MG/DL
CHLORIDE SERPL-SCNC: 102 MMOL/L
CO2 SERPL-SCNC: 25 MMOL/L
CREAT SERPL-MCNC: 0.88 MG/DL
EGFR: 79 ML/MIN/1.73M2
EOSINOPHIL # BLD AUTO: 0.08 K/UL
EOSINOPHIL NFR BLD AUTO: 1.7 %
GLUCOSE SERPL-MCNC: 91 MG/DL
HCT VFR BLD CALC: 40.3 %
HGB BLD-MCNC: 13.2 G/DL
IMM GRANULOCYTES NFR BLD AUTO: 0.2 %
LYMPHOCYTES # BLD AUTO: 1.65 K/UL
LYMPHOCYTES NFR BLD AUTO: 34.3 %
MAN DIFF?: NORMAL
MCHC RBC-ENTMCNC: 31.7 PG
MCHC RBC-ENTMCNC: 32.8 G/DL
MCV RBC AUTO: 96.6 FL
MONOCYTES # BLD AUTO: 0.36 K/UL
MONOCYTES NFR BLD AUTO: 7.5 %
NEUTROPHILS # BLD AUTO: 2.7 K/UL
NEUTROPHILS NFR BLD AUTO: 56.1 %
PLATELET # BLD AUTO: 251 K/UL
POTASSIUM SERPL-SCNC: 4.3 MMOL/L
PROT SERPL-MCNC: 6.9 G/DL
RBC # BLD: 4.17 M/UL
RBC # FLD: 13.1 %
SODIUM SERPL-SCNC: 140 MMOL/L
T3 SERPL-MCNC: 70 NG/DL
T3FREE SERPL-MCNC: 2.33 PG/ML
T3RU NFR SERPL: 0.9 TBI
THYROGLOB AB SERPL-ACNC: 27.7 IU/ML
THYROPEROXIDASE AB SERPL IA-ACNC: 43.1 IU/ML
TSH SERPL-ACNC: 1.33 UIU/ML
WBC # FLD AUTO: 4.81 K/UL

## 2025-02-13 RX ORDER — METOPROLOL TARTRATE 25 MG/1
25 TABLET ORAL DAILY
Refills: 0 | Status: ACTIVE | COMMUNITY

## 2025-02-21 ENCOUNTER — OUTPATIENT (OUTPATIENT)
Dept: OUTPATIENT SERVICES | Facility: HOSPITAL | Age: 55
LOS: 1 days | End: 2025-02-21

## 2025-02-21 DIAGNOSIS — G47.33 OBSTRUCTIVE SLEEP APNEA (ADULT) (PEDIATRIC): ICD-10-CM

## 2025-02-21 PROCEDURE — 95806 SLEEP STUDY UNATT&RESP EFFT: CPT

## 2025-02-28 ENCOUNTER — TRANSCRIPTION ENCOUNTER (OUTPATIENT)
Age: 55
End: 2025-02-28

## 2025-03-12 ENCOUNTER — NON-APPOINTMENT (OUTPATIENT)
Age: 55
End: 2025-03-12

## 2025-03-19 ENCOUNTER — APPOINTMENT (OUTPATIENT)
Dept: ELECTROPHYSIOLOGY | Facility: CLINIC | Age: 55
End: 2025-03-19
Payer: COMMERCIAL

## 2025-03-19 VITALS
BODY MASS INDEX: 21.03 KG/M2 | WEIGHT: 142 LBS | HEIGHT: 69 IN | SYSTOLIC BLOOD PRESSURE: 108 MMHG | HEART RATE: 53 BPM | DIASTOLIC BLOOD PRESSURE: 70 MMHG

## 2025-03-19 PROCEDURE — 99215 OFFICE O/P EST HI 40 MIN: CPT | Mod: 25

## 2025-03-19 PROCEDURE — 93000 ELECTROCARDIOGRAM COMPLETE: CPT

## 2025-04-14 NOTE — ED ADULT NURSE NOTE - SUICIDE SCREENING QUESTION 1
Plan: A mutual decision was made to not do Kenalog injections since they are not active. Discussed about doing a 2.5% kenalog when flared Continue Regimen: Clobetasol twice a day for 2 weeks when flared\\n\\nTumeric Detail Level: Zone No

## 2025-05-26 ENCOUNTER — NON-APPOINTMENT (OUTPATIENT)
Age: 55
End: 2025-05-26

## 2025-05-28 ENCOUNTER — APPOINTMENT (OUTPATIENT)
Dept: INTERNAL MEDICINE | Facility: CLINIC | Age: 55
End: 2025-05-28
Payer: COMMERCIAL

## 2025-05-28 VITALS
BODY MASS INDEX: 21.33 KG/M2 | HEART RATE: 64 BPM | SYSTOLIC BLOOD PRESSURE: 96 MMHG | DIASTOLIC BLOOD PRESSURE: 60 MMHG | TEMPERATURE: 98.7 F | HEIGHT: 69 IN | WEIGHT: 144 LBS | OXYGEN SATURATION: 97 % | RESPIRATION RATE: 16 BRPM

## 2025-05-28 DIAGNOSIS — G47.33 OBSTRUCTIVE SLEEP APNEA (ADULT) (PEDIATRIC): ICD-10-CM

## 2025-05-28 DIAGNOSIS — G47.19 OTHER HYPERSOMNIA: ICD-10-CM

## 2025-05-28 PROCEDURE — 99244 OFF/OP CNSLTJ NEW/EST MOD 40: CPT

## 2025-07-21 ENCOUNTER — APPOINTMENT (OUTPATIENT)
Dept: UROLOGY | Facility: CLINIC | Age: 55
End: 2025-07-21

## 2025-07-22 ENCOUNTER — APPOINTMENT (OUTPATIENT)
Dept: CT IMAGING | Facility: CLINIC | Age: 55
End: 2025-07-22
Payer: COMMERCIAL

## 2025-07-22 PROCEDURE — 82565 ASSAY OF CREATININE: CPT | Mod: QW

## 2025-07-22 PROCEDURE — 74170 CT ABD WO CNTRST FLWD CNTRST: CPT

## 2025-08-13 ENCOUNTER — APPOINTMENT (OUTPATIENT)
Dept: UROLOGY | Facility: CLINIC | Age: 55
End: 2025-08-13

## 2025-08-19 ENCOUNTER — APPOINTMENT (OUTPATIENT)
Dept: UROLOGY | Facility: CLINIC | Age: 55
End: 2025-08-19
Payer: COMMERCIAL

## 2025-08-19 PROCEDURE — 99213 OFFICE O/P EST LOW 20 MIN: CPT | Mod: 3W

## 2025-08-27 ENCOUNTER — APPOINTMENT (OUTPATIENT)
Dept: ELECTROPHYSIOLOGY | Facility: CLINIC | Age: 55
End: 2025-08-27
Payer: COMMERCIAL

## 2025-08-27 VITALS
BODY MASS INDEX: 21.03 KG/M2 | DIASTOLIC BLOOD PRESSURE: 74 MMHG | HEIGHT: 69 IN | WEIGHT: 142 LBS | SYSTOLIC BLOOD PRESSURE: 106 MMHG | HEART RATE: 52 BPM

## 2025-08-27 PROCEDURE — 93000 ELECTROCARDIOGRAM COMPLETE: CPT

## 2025-08-27 PROCEDURE — 99215 OFFICE O/P EST HI 40 MIN: CPT | Mod: 25

## 2025-08-28 ENCOUNTER — TRANSCRIPTION ENCOUNTER (OUTPATIENT)
Age: 55
End: 2025-08-28

## 2025-08-28 ENCOUNTER — APPOINTMENT (OUTPATIENT)
Dept: INTERNAL MEDICINE | Facility: CLINIC | Age: 55
End: 2025-08-28
Payer: COMMERCIAL

## 2025-08-28 VITALS — WEIGHT: 139 LBS | BODY MASS INDEX: 20.53 KG/M2

## 2025-08-28 DIAGNOSIS — E78.5 HYPERLIPIDEMIA, UNSPECIFIED: ICD-10-CM

## 2025-08-28 DIAGNOSIS — I48.0 PAROXYSMAL ATRIAL FIBRILLATION: ICD-10-CM

## 2025-08-28 DIAGNOSIS — N28.9 DISORDER OF KIDNEY AND URETER, UNSPECIFIED: ICD-10-CM

## 2025-08-28 PROCEDURE — G2211 COMPLEX E/M VISIT ADD ON: CPT | Mod: NC,95

## 2025-08-28 PROCEDURE — 99213 OFFICE O/P EST LOW 20 MIN: CPT | Mod: 95
